# Patient Record
Sex: FEMALE | URBAN - METROPOLITAN AREA
[De-identification: names, ages, dates, MRNs, and addresses within clinical notes are randomized per-mention and may not be internally consistent; named-entity substitution may affect disease eponyms.]

---

## 2017-02-06 ENCOUNTER — OFFICE VISIT (OUTPATIENT)
Dept: INTERNAL MEDICINE CLINIC | Age: 57
End: 2017-02-06

## 2017-02-06 VITALS
RESPIRATION RATE: 12 BRPM | OXYGEN SATURATION: 98 % | WEIGHT: 132.2 LBS | DIASTOLIC BLOOD PRESSURE: 86 MMHG | TEMPERATURE: 97.8 F | HEART RATE: 65 BPM | BODY MASS INDEX: 22.57 KG/M2 | HEIGHT: 64 IN | SYSTOLIC BLOOD PRESSURE: 138 MMHG

## 2017-02-06 DIAGNOSIS — K51.80 OTHER ULCERATIVE COLITIS WITHOUT COMPLICATION (HCC): ICD-10-CM

## 2017-02-06 DIAGNOSIS — I10 ESSENTIAL HYPERTENSION WITH GOAL BLOOD PRESSURE LESS THAN 130/80: ICD-10-CM

## 2017-02-06 DIAGNOSIS — E78.00 ELEVATED CHOLESTEROL: ICD-10-CM

## 2017-02-06 DIAGNOSIS — E06.3 HASHIMOTO'S DISEASE: ICD-10-CM

## 2017-02-06 DIAGNOSIS — E03.9 HYPOTHYROIDISM, UNSPECIFIED TYPE: ICD-10-CM

## 2017-02-06 DIAGNOSIS — E04.2 MULTIPLE THYROID NODULES: ICD-10-CM

## 2017-02-06 DIAGNOSIS — I10 ESSENTIAL HYPERTENSION WITH GOAL BLOOD PRESSURE LESS THAN 130/80: Primary | ICD-10-CM

## 2017-02-06 NOTE — MR AVS SNAPSHOT
Visit Information Date & Time Provider Department Dept. Phone Encounter #  
 2/6/2017  9:30 AM Sterling Silva NP [facility] [address] [id number] Upcoming Health Maintenance Date Due COLONOSCOPY 4/30/2015 BREAST CANCER SCRN MAMMOGRAM 5/30/2016 INFLUENZA AGE 9 TO ADULT 8/1/2016 PAP AKA CERVICAL CYTOLOGY 3/24/2017 DTaP/Tdap/Td series (2 - Td) 10/13/2025 Allergies as of 2/6/2017  Review Complete On: 2/6/2017 By: Sterling Silva NP Severity Noted Reaction Type Reactions Sulfa (Sulfonamide Antibiotics) Medium 03/24/2014    Hives Sulfa (Sulfonamide Antibiotics)  10/13/2015    Hives Current Immunizations  Reviewed on 10/2/2015 Name Date Influenza Vaccine 10/2/2015 Tdap 10/13/2015  9:29 AM  
  
 Not reviewed this visit You Were Diagnosed With   
  
 Codes Comments Essential hypertension with goal blood pressure less than 130/80    -  Primary ICD-10-CM: I10 
ICD-9-CM: 401.9 Hypothyroidism, unspecified type     ICD-10-CM: E03.9 ICD-9-CM: 244.9 Hashimoto's disease     ICD-10-CM: E06.3 ICD-9-CM: 245.2 Multiple thyroid nodules     ICD-10-CM: E04.2 ICD-9-CM: 241.1 Other ulcerative colitis without complication (HCC)     SYX-93-GR: K51.80 Elevated cholesterol     ICD-10-CM: E78.00 ICD-9-CM: 272.0 Vitals BP Pulse Temp Resp Height(growth percentile) Weight(growth percentile) 138/86 (BP 1 Location: Left arm, BP Patient Position: Sitting) 65 97.8 °F (36.6 °C) (Oral) 12 5' 4\" (1.626 m) 132 lb 3.2 oz (60 kg) SpO2 BMI OB Status Smoking Status 98% 22.69 kg/m2 Postmenopausal Never Smoker BMI and BSA Data Body Mass Index Body Surface Area  
 22.69 kg/m 2 1.65 m 2 Preferred Pharmacy Pharmacy Name Phone [id number] [address] Your Updated Medication List  
  
   
 This list is accurate as of: 2/6/17 10:36 AM.  Always use your most recent med list.  
  
  
  
  
 * ASACOL  mg DR tablet Generic drug:  mesalamine DR Take 2,400 mg by mouth two (2) times a day. * mesalamine 4 gram/60 mL enema Commonly known as:  ROWASA Insert 4 g into rectum as needed (for colitis flare-up). cholecalciferol 1,000 unit Cap Commonly known as:  VITAMIN D3  
1000 units daily  
  
 fish oil-dha-epa 1,200-144-216 mg Cap Take 1,200 mg by mouth two (2) times a day. losartan 25 mg tablet Commonly known as:  COZAAR Take 1 Tab by mouth daily. synthroid 50 mcg tablet Generic drug:  levothyroxine Take 50-75 mcg by mouth Daily (before breakfast). 75 mcg Mon, Wed, and Friday and 50 mcg Tues, Thurs, Sat and Sun  
  
 * Notice: This list has 2 medication(s) that are the same as other medications prescribed for you. Read the directions carefully, and ask your doctor or other care provider to review them with you. We Performed the Following REFERRAL TO ENDOCRINOLOGY [ZCZ71 Custom] Comments:  
 Previously followed with Dr Elizabeth Bolanos, needs to establish care with new endocrinologist locally. Would like to establish care with Dr Rao Mcrae group. To-Do List   
 02/06/2017 Lab:  CBC WITH AUTOMATED DIFF   
  
 02/06/2017 Lab:  LIPID PANEL   
  
 02/06/2017 Lab:  METABOLIC PANEL, COMPREHENSIVE   
  
 02/06/2017 Lab:  T4, FREE   
  
 02/06/2017 Lab:  TSH 3RD GENERATION   
  
 05/06/2017 Lab:  CBC WITH AUTOMATED DIFF   
  
 05/06/2017 Lab:  LIPID PANEL   
  
 05/06/2017 Lab:  METABOLIC PANEL, COMPREHENSIVE   
  
 05/06/2017 Lab:  T4, FREE   
  
 05/06/2017 Lab:  TSH 3RD GENERATION Referral Information Referral ID Referred By Referred To  
  
 [id number] Natividad Whiting Not Available Visits Status Start Date End Date 1 New Request 2/6/17 2/6/18 If your referral has a status of pending review or denied, additional information will be sent to support the outcome of this decision. Patient Instructions Patient was given a copy of the Advanced Directive form and understands to bring it in once completed. Health Maintenance Due Topic Date Due  
 COLONOSCOPY  04/30/2015  BREAST CANCER SCRN MAMMOGRAM  05/30/2016  INFLUENZA AGE 9 TO ADULT  08/01/2016  PAP AKA CERVICAL CYTOLOGY  03/24/2017 Introducing [address]! Dear Diana Davalos: 
Thank you for requesting a GoodData account. Our records indicate that you already have an active GoodData account. You can access your account anytime at [email or web address] Did you know that you can access your hospital and ER discharge instructions at any time in GoodData? You can also review all of your test results from your hospital stay or ER visit. Additional Information If you have questions, please visit the Frequently Asked Questions section of the GoodData website at [email or web address] Remember, GoodData is NOT to be used for urgent needs. For medical emergencies, dial 911. Now available from your iPhone and Android! Please provide this summary of care documentation to your next provider. Your primary care clinician is listed as Estephania Abdi. If you have any questions after today's visit, please call [phone].

## 2017-02-06 NOTE — PROGRESS NOTES
HISTORY OF PRESENT ILLNESS  Sylwia Vo is a 59 y.o. female. Here today for routine follow up. Previous patient of Dr Dawit Lozano. PMH: HTN, UC, hypothyroidism, multiple thyroid nodules, renal stones. No complaints today. Hypertension    The history is provided by the patient. This is a chronic problem. The current episode started more than 1 week ago. The problem has not changed since onset. Pertinent negatives include no chest pain, no palpitations, no blurred vision, no headaches, no shortness of breath, no nausea and no vomiting. Risk factors include hypertension and postmenopause. Cholesterol Problem   The history is provided by the patient (elevated TC and LDL, good HDL). This is a chronic problem. The current episode started more than 1 week ago. The problem has not changed since onset. Pertinent negatives include no chest pain, no abdominal pain, no headaches and no shortness of breath. Associated symptoms comments: No family history of CAD. Treatments tried: diet controlled only. Hypothyroidism/ hashimotos/ thyroid nodules  Previously followed with endocrinology Dr Jesus Witt. Continues on synthroid 50mcg daily. She is due for labs. UC  No recent symptoms. She did meet with her GI and will be moving forward with routine colonoscopy for eval.     Past Medical History   Diagnosis Date    Calculus of kidney      with pregnancy.  H/O colonoscopy      due for repeat colonoscopy by end of 2015 per Dr. Katia Mackey Hypertension      has been borderline.  Hypothyroid     Thyroid disease      Hashimotos    Thyroid nodule      biopsied 1 yr ago, benign per pt with Dr Indigo Pastrana in [place] [address] Ulcerative colitis [facility])      last flare in Oct 2013. Dr Lorri Beard. Enemas work for her. Colonoscopy scheduled for April 2014 Q2 yrs. Past Surgical History   Procedure Laterality Date    Pr breast surgery procedure unlisted  1992, 2009     cyst removal, augmentation    Hx colonoscopy       Q 2 yrs.  Due April 2014.    Pr breast surgery procedure unlisted       augmentation     Current Outpatient Prescriptions   Medication Sig    losartan (COZAAR) 25 mg tablet Take 1 Tab by mouth daily.  fish oil-dha-epa 1,200-144-216 mg cap Take 1,200 mg by mouth two (2) times a day.  mesalamine DR (ASACOL HD) 800 mg DR tablet Take 2,400 mg by mouth two (2) times a day.  levothyroxine (SYNTHROID) 50 mcg tablet Take 50-75 mcg by mouth Daily (before breakfast). 75 mcg Mon, Wed, and Friday and 50 mcg Tues, Thurs, Sat and Sun    mesalamine (ROWASA) 4 gram/60 mL enema Insert 4 g into rectum as needed (for colitis flare-up).  Cholecalciferol, Vitamin D3, (VITAMIN D3) 1,000 unit cap 1000 units daily     No current facility-administered medications for this visit. Allergies and Intolerances: Allergies   Allergen Reactions    Sulfa (Sulfonamide Antibiotics) Hives    Sulfa (Sulfonamide Antibiotics) Hives     Family History:   Family History   Problem Relation Age of Onset    Cancer Father      lymphoma    Hypertension Mother         Social History:   She  reports that she has never smoked. She does not have any smokeless tobacco history on file. She  reports that she drinks about 2.4 oz of alcohol per week      Vitals:   Visit Vitals    /86 (BP 1 Location: Left arm, BP Patient Position: Sitting)    Pulse 65    Temp 97.8 °F (36.6 °C) (Oral)    Resp 12    Ht 5' 4\" (1.626 m)    Wt 132 lb 3.2 oz (60 kg)    SpO2 98%    BMI 22.69 kg/m2        Body surface area is 1.65 meters squared. BP Readings from Last 3 Encounters:   02/06/17 138/86   06/22/16 (!) 140/94   10/13/15 (!) 150/97        Wt Readings from Last 3 Encounters:   02/06/17 132 lb 3.2 oz (60 kg)   06/22/16 125 lb 6.4 oz (56.9 kg)   10/13/15 130 lb (59 kg)        Case and notes discussed with MA and reviewed with patient for accuracy.      I have personally reviewed and subsequently discussed with the MA any pertinent, preliminary and incomplete elements of the history related to the chief complaint that were recorded by the MA in the patients chart during the initial rooming of the patient. As I have explored the necessary and required elements in detail with the patient during my personal interview with them, I have personally verified, clarified, amended, added to and/or revised said elements based on information acquired during during the interview. Review of Systems   Constitutional: Negative for chills and fever. HENT: Negative for congestion, ear pain and sore throat. Eyes: Negative for blurred vision and double vision. Respiratory: Negative for cough, hemoptysis, shortness of breath and wheezing. Cardiovascular: Negative for chest pain, palpitations and leg swelling. Gastrointestinal: Negative for abdominal pain, blood in stool, constipation, diarrhea, melena, nausea and vomiting. Genitourinary: Negative for dysuria, frequency, hematuria and urgency. Musculoskeletal: Negative for falls. Skin: Negative for itching and rash. Neurological: Negative for seizures, loss of consciousness and headaches. Physical Exam   Constitutional: She appears well-developed and well-nourished. No distress. HENT:   Head: Normocephalic and atraumatic. Nose: Nose normal.   Mouth/Throat: Uvula is midline, oropharynx is clear and moist and mucous membranes are normal.   Eyes: Conjunctivae are normal. Pupils are equal, round, and reactive to light. Cardiovascular: Normal rate, regular rhythm and normal heart sounds. Pulmonary/Chest: Effort normal and breath sounds normal. No respiratory distress. She has no wheezes. Abdominal: Soft. Bowel sounds are normal. She exhibits no distension. There is no tenderness. Musculoskeletal: She exhibits no edema. Neurological: She is alert. Skin: Skin is warm and dry. No rash noted. Psychiatric: She has a normal mood and affect.  Her behavior is normal.   Nursing note and vitals reviewed. 3.6%  10-year risk of heart disease or stroke  Learn how you can track your risk. On the basis of your age and calculated risk for heart disease or stroke under 10%, the USPSTF guidelines suggest you would not likely benefit from starting aspirin. On the basis of your calculated risk for heart disease or stroke less than 7.5%, the ACC/AHA guidelines suggest you have no indication to be on a statin. Based on your age, your blood pressure is well-controlled. Demography Cholesterol Blood pressure Risk factors   Age: 59 Total: 505 Systolic: 320 Diabetes: no   Gender: female HDL: 96 Diastolic: 86 Smoking: no   Race: not African-American  On medication: yes    Notes and further reading  Moderate intensity statin may be atorvastatin 10mg, pravastatin 40mg, or simvastatin 20-40mg. High intensity statin may be atorvastatin 40mg-80mg. AHA/ACC guidelines stress the importance of lifestyle modifications to lower cardiovascular disease risk in all patients. This includes eating a heart-healthy diet, regular aerobic exercises, maintenance of desirable body weight and avoidance of tobacco products. Before initiating statin therapy, clinicians and patients ought to engage in a discussion which considers addressing risk factors such as smoking and optimal lifestyle, the potential for ASCVD risk reduction benefits, adverse medication effects, drug-drug interactions, and patient preferences for treatment. Additional factors may be considered to inform treatment decision making.  These factors may include primary LDL-C greater than 160 mg/dL or other evidence of genetic hyperlipidemias, family history of premature ASCVD with onset less than 54years of age in a first degree male relative or less than 72years of age in a first degree female relative, high-sensitivity C-reactive protein greater than 2 mg/L, CAC score greater than 300 Agatston units or greater than 76 percentile for age, sex, and ethnicity, ankle-brachial index less than 0.9, or elevated lifetime risk of ASCVD. ASSESSMENT and PLAN    ICD-10-CM ICD-9-CM    1. Essential hypertension with goal blood pressure less than 130/80: stable, well controlled, continues on losartan 25mg daily. Routine fasting labs ordered. Will have these drawn now and again prior to our 6 month follow up as well. I10 401.9 CBC WITH AUTOMATED DIFF      METABOLIC PANEL, COMPREHENSIVE      CBC WITH AUTOMATED DIFF      METABOLIC PANEL, COMPREHENSIVE   2. Hypothyroidism, unspecified type: continues on synthroid 50mcg daily. Due for labs, ordered today. She would like to establish care with a new endocrinologist as her previous one has left the area. Referral placed today. E03.9 244.9 REFERRAL TO ENDOCRINOLOGY      CBC WITH AUTOMATED DIFF      METABOLIC PANEL, COMPREHENSIVE      TSH 3RD GENERATION      T4, FREE      CBC WITH AUTOMATED DIFF      METABOLIC PANEL, COMPREHENSIVE      TSH 3RD GENERATION      T4, FREE   3. Hashimoto's disease: will check thyroid labs. will be establishing care with endocrinology for monitoring this. E06.3 245.2 REFERRAL TO ENDOCRINOLOGY      TSH 3RD GENERATION      T4, FREE      TSH 3RD GENERATION      T4, FREE   4. Multiple thyroid nodules: will be establishing care with endocrinology for monitoring this. E04.2 241.1 REFERRAL TO ENDOCRINOLOGY      TSH 3RD GENERATION      T4, FREE      TSH 3RD GENERATION      T4, FREE   5. Other ulcerative colitis without complication ([facility] [address]): stable, no recent flairs, will be moving forward with colonoscopy this year with GI.  K51.80     6. Elevated cholesterol: Discussed elevated cholesterol with patient, what this is, what this means for their health, increased risks associated like heart attack and stroke. 10 year risk calculated at 3.6% not in statin benefit group. Will continue to work on healthy lifestyle, diet, exercise.     E78.00 272.0 LIPID PANEL      LIPID PANEL     Gissellevenus Parra was seen today for hypertension, thyroid problem, vitamin d deficiency and labs. Diagnoses and all orders for this visit:    Essential hypertension with goal blood pressure less than 130/80  -     CBC WITH AUTOMATED DIFF; Future  -     METABOLIC PANEL, COMPREHENSIVE; Future  -     CBC WITH AUTOMATED DIFF; Future  -     METABOLIC PANEL, COMPREHENSIVE; Future    Hypothyroidism, unspecified type  -     REFERRAL TO ENDOCRINOLOGY  -     CBC WITH AUTOMATED DIFF; Future  -     METABOLIC PANEL, COMPREHENSIVE; Future  -     TSH 3RD GENERATION; Future  -     T4, FREE; Future  -     CBC WITH AUTOMATED DIFF; Future  -     METABOLIC PANEL, COMPREHENSIVE; Future  -     TSH 3RD GENERATION; Future  -     T4, FREE; Future    Hashimoto's disease  -     REFERRAL TO ENDOCRINOLOGY  -     TSH 3RD GENERATION; Future  -     T4, FREE; Future  -     TSH 3RD GENERATION; Future  -     T4, FREE; Future    Multiple thyroid nodules  -     REFERRAL TO ENDOCRINOLOGY  -     TSH 3RD GENERATION; Future  -     T4, FREE; Future  -     TSH 3RD GENERATION; Future  -     T4, FREE; Future    Other ulcerative colitis without complication (HCC)    Elevated cholesterol  -     LIPID PANEL; Future  -     LIPID PANEL; Future      Follow-up Disposition:  Return in about 6 months (around 8/6/2017), or if symptoms worsen or fail to improve. The plan of care was discussed with the patient, who verbalizes understanding. Discussed all medications, side effects with patient. The patient is to follow up as scheduled and will report to the ED or the office if symptoms change or increase. The patient has voiced understanding and will comply to plan of care.

## 2017-02-06 NOTE — PATIENT INSTRUCTIONS
Patient was given a copy of the Advanced Directive form and understands to bring it in once completed.   Health Maintenance Due   Topic Date Due    COLONOSCOPY  04/30/2015    BREAST CANCER SCRN MAMMOGRAM  05/30/2016    INFLUENZA AGE 9 TO ADULT  08/01/2016    PAP AKA CERVICAL CYTOLOGY  03/24/2017

## 2017-02-06 NOTE — PROGRESS NOTES
Chief Complaint   Patient presents with    Hypertension    Thyroid Problem    Vitamin D Deficiency    Labs     in Media of chart [facility] done 8-30-16 to discuss      Patient was given a copy of the Advanced Directive form and understands to bring it in once completed. 1. Have you been to the ER, urgent care clinic since your last visit? Hospitalized since your last visit? No    2. Have you seen or consulted any other health care providers outside of the [address] [place] [address] since your last visit? Include any pap smears or colon screening.  No

## 2017-02-11 LAB
CREATININE, EXTERNAL: 0.72
LDL-C, EXTERNAL: 108

## 2017-02-13 ENCOUNTER — TELEPHONE (OUTPATIENT)
Dept: INTERNAL MEDICINE CLINIC | Age: 57
End: 2017-02-13

## 2017-02-13 NOTE — TELEPHONE ENCOUNTER
Please call patient and notify her of stable labs received from [facility]. Follow up as scheduled sooner if needed. Thanks!

## 2017-06-11 PROBLEM — E04.1 THYROID NODULE: Status: ACTIVE | Noted: 2017-06-11

## 2017-06-21 DIAGNOSIS — I10 ESSENTIAL HYPERTENSION WITH GOAL BLOOD PRESSURE LESS THAN 130/85: ICD-10-CM

## 2017-06-21 RX ORDER — LOSARTAN POTASSIUM 25 MG/1
25 TABLET ORAL DAILY
Qty: 90 TAB | Refills: 3 | Status: SHIPPED | OUTPATIENT
Start: 2017-06-21 | End: 2019-04-23 | Stop reason: ALTCHOICE

## 2018-08-21 ENCOUNTER — OFFICE VISIT (OUTPATIENT)
Dept: INTERNAL MEDICINE CLINIC | Age: 58
End: 2018-08-21

## 2018-08-21 VITALS
WEIGHT: 136.2 LBS | OXYGEN SATURATION: 98 % | HEIGHT: 64 IN | TEMPERATURE: 97.8 F | HEART RATE: 74 BPM | SYSTOLIC BLOOD PRESSURE: 142 MMHG | DIASTOLIC BLOOD PRESSURE: 90 MMHG | BODY MASS INDEX: 23.25 KG/M2 | RESPIRATION RATE: 12 BRPM

## 2018-08-21 DIAGNOSIS — M72.2 PLANTAR FASCIITIS: ICD-10-CM

## 2018-08-21 DIAGNOSIS — E04.1 THYROID NODULE: ICD-10-CM

## 2018-08-21 DIAGNOSIS — K51.80 OTHER ULCERATIVE COLITIS WITHOUT COMPLICATION (HCC): ICD-10-CM

## 2018-08-21 DIAGNOSIS — I10 ESSENTIAL HYPERTENSION WITH GOAL BLOOD PRESSURE LESS THAN 130/80: Primary | ICD-10-CM

## 2018-08-21 DIAGNOSIS — E06.3 HASHIMOTO'S DISEASE: ICD-10-CM

## 2018-08-21 RX ORDER — MULTIVITAMIN
TABLET ORAL DAILY
COMMUNITY

## 2018-08-21 NOTE — PROGRESS NOTES
Chief Complaint   Patient presents with   [address] Hypertension     follow up   Patient was given a copy of the Advanced Medical Directive form and understands to bring it in once completed. 1. Have you been to the ER, urgent care clinic since your last visit? Hospitalized since your last visit? No    2. Have you seen or consulted any other health care providers outside of the [address] [place] [address] since your last visit? Include any pap smears or colon screening.  No

## 2018-08-21 NOTE — PATIENT INSTRUCTIONS
Patient was given a copy of the Advanced Medical Directive form and understands to bring it in once completed.   Health Maintenance Due   Topic Date Due    BREAST CANCER SCRN MAMMOGRAM  05/30/2016    PAP AKA CERVICAL CYTOLOGY  03/24/2017    ZOSTER VACCINE AGE 60>  12/01/2017    Influenza Age 9 to Adult  08/01/2018    COLONOSCOPY  12/17/2018

## 2018-08-21 NOTE — MR AVS SNAPSHOT
[facility] 
 
 
 [address], [address] [facility] 
[phone] Patient: Carlos Alberto Ventura MRN: [id number] MSC:[id number] Visit Information Date & Time Provider Department Dept. Phone Encounter #  
 8/21/2018 10:00 AM Annie Flores MD [facility] [place] [id number] Follow-up Instructions Return in about 5 months (around 1/15/2019) for BP check, lab review. Your Appointments 1/11/2019  8:15 AM  
Office Visit with Annie Flores MD  
[facility]) [address], [address] [place] [address] [address] [facility]  
  
   
 [address], [address] Upcoming Health Maintenance Date Due  
 BREAST CANCER SCRN MAMMOGRAM 5/30/2016 PAP AKA CERVICAL CYTOLOGY 3/24/2017 ZOSTER VACCINE AGE 60> 12/1/2017 Influenza Age 5 to Adult 8/1/2018 COLONOSCOPY 12/17/2018 DTaP/Tdap/Td series (2 - Td) 10/13/2025 Allergies as of 8/21/2018  Review Complete On: 8/21/2018 By: Annie Flores MD  
  
 Severity Noted Reaction Type Reactions Sulfa (Sulfonamide Antibiotics) Medium 10/28/2013    Hives Sulfa (Sulfonamide Antibiotics)  10/13/2015    Hives Current Immunizations  Reviewed on 10/2/2015 Name Date Influenza Vaccine 10/2/2015 Tdap 10/13/2015  9:29 AM  
  
 Not reviewed this visit Vitals BP Pulse Temp Resp Height(growth percentile) Weight(growth percentile) 142/90 (BP 1 Location: Right arm, BP Patient Position: Sitting) 74 97.8 °F (36.6 °C) (Oral) 12 5' 4\" (1.626 m) 136 lb 3.2 oz (61.8 kg) SpO2 BMI OB Status Smoking Status 98% 23.38 kg/m2 Postmenopausal Never Smoker Vitals History BMI and BSA Data Body Mass Index Body Surface Area  
 23.38 kg/m 2 1.67 m 2 Preferred Pharmacy Pharmacy Name Phone [facility], [address] [place] [address] Your Updated Medication List  
  
   
This list is accurate as of 8/21/18 11:16 AM.  Always use your most recent med list.  
  
  
  
  
 * ASACOL  mg DR tablet Generic drug:  mesalamine DR Take 2,400 mg by mouth daily. * mesalamine 4 gram/60 mL enema Commonly known as:  ROWASA Insert 4 g into rectum as needed (for colitis flare-up). DAILY MULTIPLE FOR WOMEN 50+ 0.4 mg Tab Generic drug:  Vit B Comp & C-Vit E-FA-Carol-Zn Take  by mouth daily. fish oil-dha-epa 1,200-144-216 mg Cap Take 1,000 mg by mouth daily. losartan 25 mg tablet Commonly known as:  COZAAR Take 1 Tab by mouth daily. synthroid 50 mcg tablet Generic drug:  levothyroxine Take 50-75 mcg by mouth Daily (before breakfast). 75 mcg Mon, Wed, and Friday and 50 mcg Tues, Thurs, Sat and Sun  
  
 * Notice: This list has 2 medication(s) that are the same as other medications prescribed for you. Read the directions carefully, and ask your doctor or other care provider to review them with you. Follow-up Instructions Return in about 5 months (around 1/15/2019) for BP check, lab review. Patient Instructions Patient was given a copy of the Advanced Medical Directive form and understands to bring it in once completed. Health Maintenance Due Topic Date Due  
 BREAST CANCER SCRN MAMMOGRAM  05/30/2016  PAP AKA CERVICAL CYTOLOGY  03/24/2017  ZOSTER VACCINE AGE 60>  12/01/2017  Influenza Age 5 to Adult  08/01/2018  COLONOSCOPY  12/17/2018 Introducing [facility]! Dear Carrie Villegas: 
Thank you for requesting a Seakeeper account. Our records indicate that you already have an active Seakeeper account. You can access your account anytime at [email or web address] Did you know that you can access your hospital and ER discharge instructions at any time in Seakeeper? You can also review all of your test results from your hospital stay or ER visit. Additional Information If you have questions, please visit the Frequently Asked Questions section of the [employer] website at [email or web address] Remember, [employer] is NOT to be used for urgent needs. For medical emergencies, dial 911. Now available from your iPhone and Android! Please provide this summary of care documentation to your next provider. Your primary care clinician is listed as Lita Landeros. If you have any questions after today's visit, please call [phone].

## 2018-08-21 NOTE — PROGRESS NOTES
INTERNISTS OF [facility]:  8/21/2018, MRN: [id number]      Shelly Parker is a 59 y.o. female and presents to clinic to Lina Wheeler and Hypertension (follow up)    Subjective: The patient is a 77-year-old female with history of ?hypertension, hyperlipidemia, Hashimoto's disease per EHR, thyroid nodule, and ulcerative colitis (diagnosed at age 23 and followed by Neeraj Price). 1. ?HTN: She takes losartan 25 mg daily. She reports no adverse side effects of taking his medication. Her blood pressure today is mildly elevated at 142/90. She checks her blood pressure off and on at home and there are readings less than 120/80. 2.  Hypothyroidism and History of Thyroid Nodules: She is followed by [facility]. She is on Synthroid. She is presently asymptomatic. 3.  Ulcerative Colitis: She was diagnosed at age 23. Her last flare was 4 years ago. Since then, she has not had any rectal bleeding or hematochezia. No cramping. She gets colonoscopies every 2 years. Her last colonoscopy was a year ago. She also gets regular eye exams and denies vision changes. No arthralgias. +Followed by . 4.  Right plantar fasciitis?:  This year she reports a shooting/stabbing pain along her right foot lateral edge, associated with stiffness in the morning. She also has stiffness along her calf in the morning. Symptoms improve/resolve with stretching exercises. 5.  Health Maintenance:  -No tobacco use. No drug use. No energy drink consumption.  -She will have 2-3 glasses of wine each night.  -She is followed by dermatologist for yearly skin exams.  -Her mammogram is scheduled for next month. She denies breast pain or masses.       Patient Active Problem List    Diagnosis Date Noted    Thyroid nodule 06/11/2017    Essential hypertension with goal blood pressure less than 130/80 06/22/2016    Hyperlipidemia 06/22/2016    Hashimoto's disease 03/24/2014    UC (ulcerative colitis) ([facility] [address] 03/24/2014       Current Outpatient Prescriptions   Medication Sig Dispense Refill    Vit B Comp & C-Vit E-FA-Carol-Zn (DAILY MULTIPLE FOR WOMEN 50+) 0.4 mg tab Take  by mouth daily.  losartan (COZAAR) 25 mg tablet Take 1 Tab by mouth daily. 90 Tab 3    fish oil-dha-epa 1,200-144-216 mg cap Take 1,000 mg by mouth daily.  mesalamine DR (ASACOL HD) 800 mg DR tablet Take 2,400 mg by mouth daily.  levothyroxine (SYNTHROID) 50 mcg tablet Take 50-75 mcg by mouth Daily (before breakfast). 75 mcg Mon, Wed, and Friday and 50 mcg Tues, Thurs, Sat and Sun      mesalamine (ROWASA) 4 gram/60 mL enema Insert 4 g into rectum as needed (for colitis flare-up). Allergies   Allergen Reactions    Sulfa (Sulfonamide Antibiotics) Hives    Sulfa (Sulfonamide Antibiotics) Hives       Past Medical History:   Diagnosis Date    Autoimmune disease ([facility] [address]     Calculus of kidney     with pregnancy.  H/O colonoscopy     due for repeat colonoscopy by end of 2015 per Dr. Mya Zamora    Hypercholesterolemia     Hypertension     Hypothyroid     Thyroid disease     Hashimotos    Thyroid nodule     biopsied 1 yr ago, benign per pt with Dr Angi Mark in [place] [address] Ulcerative colitis [facility])     last flare in Oct 2013. Dr Mya Zamora. Enemas work for her. Colonoscopy scheduled for April 2014 Q2 yrs. Past Surgical History:   Procedure Laterality Date    BREAST SURGERY PROCEDURE UNLISTED  1992, 2009    cyst removal, augmentation    BREAST SURGERY PROCEDURE UNLISTED      augmentation    HX BREAST AUGMENTATION  2009    HX COLONOSCOPY      Q 2 yrs. Due April 2014.     HX WISDOM TEETH EXTRACTION      x2       Family History   Problem Relation Age of Onset    Hypertension Mother     Thyroid Disease Mother     Cancer Father      lymphoma    Thyroid Disease Brother     Depression Brother     Thyroid Disease Sister     No Known Problems Maternal Grandmother     No Known Problems Maternal Grandfather     No Known Problems Paternal Grandmother     No Known Problems Paternal Grandfather     No Known Problems Son     Thyroid Disease Daughter      1 daughter       Social History   Substance Use Topics    Smoking status: Never Smoker    Smokeless tobacco: Never Used    Alcohol use 2.4 oz/week     4 Glasses of wine per week      Comment: weekends       ROS   Review of Systems   Constitutional: Negative for chills and fever. HENT: Negative for ear pain and sore throat. Eyes: Negative for blurred vision and pain. Respiratory: Negative for cough and shortness of breath. Cardiovascular: Negative for chest pain. Gastrointestinal: Negative for abdominal pain, blood in stool and melena. Genitourinary: Negative for dysuria and hematuria. Musculoskeletal: Positive for joint pain. Negative for myalgias. Skin: Negative for rash. Neurological: Negative for tingling, focal weakness and headaches. Endo/Heme/Allergies: Does not bruise/bleed easily. Psychiatric/Behavioral: Negative for substance abuse. Objective     Vitals:    08/21/18 0953 08/21/18 1111 08/21/18 1113   BP: (!) 154/102 (!) 150/93 142/90   Pulse: 73 74 74   Resp: 12 12    Temp: 97.8 °F (36.6 °C)     TempSrc: Oral     SpO2: 98% 98%    Weight: 136 lb 3.2 oz (61.8 kg)     Height: 5' 4\" (1.626 m)     PainSc:   0 - No pain   0 - No pain        Physical Exam   Constitutional: She is oriented to person, place, and time and well-developed, well-nourished, and in no distress. HENT:   Head: Normocephalic and atraumatic. Right Ear: External ear normal.   Left Ear: External ear normal.   Nose: Nose normal.   Mouth/Throat: Oropharynx is clear and moist. No oropharyngeal exudate. Eyes: Conjunctivae and EOM are normal. Pupils are equal, round, and reactive to light. Right eye exhibits no discharge. Left eye exhibits no discharge. No scleral icterus. Neck: Neck supple. No thyromegaly present.    Cardiovascular: Normal rate, regular rhythm, normal heart sounds and intact distal pulses. Exam reveals no gallop and no friction rub. No murmur heard. Pulmonary/Chest: Effort normal and breath sounds normal. No respiratory distress. She has no wheezes. She has no rales. Abdominal: Soft. Bowel sounds are normal. She exhibits no distension. There is no tenderness. There is no rebound and no guarding. No organomegaly   Musculoskeletal: She exhibits no edema or tenderness (BUE). Good ROM along her right foot   Lymphadenopathy:     She has no cervical adenopathy. Neurological: She is alert and oriented to person, place, and time. She exhibits normal muscle tone. Gait normal.   Skin: Skin is warm and dry. No erythema. Psychiatric: Affect normal.   Nursing note and vitals reviewed. LABS   Data Review:   Lab Results   Component Value Date/Time    WBC 5.8 10/13/2015 09:30 AM    HGB 13.7 10/13/2015 09:30 AM    HCT 42.1 10/13/2015 09:30 AM    PLATELET 921 41/53/0104 09:30 AM    MCV 89.0 10/13/2015 09:30 AM       Lab Results   Component Value Date/Time    Sodium 139 10/13/2015 09:30 AM    Potassium 4.3 10/13/2015 09:30 AM    Chloride 101 10/13/2015 09:30 AM    CO2 31 10/13/2015 09:30 AM    Anion gap 7 10/13/2015 09:30 AM    Glucose 95 10/13/2015 09:30 AM    BUN 12 10/13/2015 09:30 AM    Creatinine 0.60 10/13/2015 09:30 AM    BUN/Creatinine ratio 20 10/13/2015 09:30 AM    GFR est AA >60 10/13/2015 09:30 AM    GFR est non-AA >60 10/13/2015 09:30 AM    Calcium 9.0 10/13/2015 09:30 AM       Assessment/Plan:   1. Ulcerative Colitis: Stable. -Continue with mesalamine and GI follow-up. -I will need to check a CMP, CRP, and a CBC is not checked before her follow-up appointment with me. She is scheduled with the GI team in December. 2.  Hypothyroidism: Stable. -Continue Synthroid. I will need to check TFTs and a CBC is not checked prior to her follow-up appointment with me in 5 months.    3. ?HTN Hx: +White coat hypertension per her hx given low BPs at home - but asymptomatic on losartan.  -Okay to continue with losartan. I told her she can stop this medication and monitor her blood pressure at home. We discussed how her low blood pressure is her most accurate blood pressure and how her blood pressure can be falsely elevated secondary to whitecoat hypertension. We discussed her blood pressure goal of less than 140/90.  -I will need to order a BMP, lipid panel, and a urine protein screen is not checked just prior to her follow-up appointment. 4.  Right Plantar Fasciitis?  -The patient declined a referral to orthopedics at this time. She will let me know if her symptoms worsen.    -For now, activity as tolerated is recommended. She can take Tylenol as needed. 5.  Health Maintenance:  -I encouraged her to get her mammogram.  -The patient stated that she will schedule an appointment for a Pap smear, declining for me to order a referral      Health Maintenance Due   Topic Date Due    BREAST CANCER SCRN MAMMOGRAM  05/30/2016    PAP AKA CERVICAL CYTOLOGY  03/24/2017    ZOSTER VACCINE AGE 60>  12/01/2017    Influenza Age 9 to Adult  08/01/2018    COLONOSCOPY  12/17/2018     Lab review: labs are reviewed in the EHR     I have discussed the diagnosis with the patient and the intended plan as seen in the above orders. The patient has received an after-visit summary and questions were answered concerning future plans. I have discussed medication side effects and warnings with the patient as well. I have reviewed the plan of care with the patient, accepted their input and they are in agreement with the treatment goals. All questions were answered. The patient understands the plan of care. Handouts provided today with above information. Pt instructed if symptoms worsen to call the office or report to the ED for continued care. Greater than 50% of the visit time was spent in counseling and/or coordination of care.       Voice recognition was used to generate this report, which may have resulted in some phonetic based errors in grammar and contents. Even though attempts were made to correct all the mistakes, some may have been missed, and remained in the body of the document. Follow-up Disposition:  Return in about 5 months (around 1/15/2019) for BP check, lab review.     Torres Gambino MD

## 2019-02-21 LAB — MAMMOGRAPHY, EXTERNAL: NORMAL

## 2019-03-16 LAB — CREATININE, EXTERNAL: 0.64

## 2019-04-04 ENCOUNTER — TELEPHONE (OUTPATIENT)
Dept: INTERNAL MEDICINE CLINIC | Age: 59
End: 2019-04-04

## 2019-04-05 NOTE — TELEPHONE ENCOUNTER
Called patient and verified full name and date of birth. Informed patient of Dr. Jose L Moody message on patient's lab results. Patient verbalized understanding without questions or concerns.

## 2019-04-23 ENCOUNTER — OFFICE VISIT (OUTPATIENT)
Dept: INTERNAL MEDICINE CLINIC | Age: 59
End: 2019-04-23

## 2019-04-23 VITALS
TEMPERATURE: 97.4 F | DIASTOLIC BLOOD PRESSURE: 82 MMHG | RESPIRATION RATE: 12 BRPM | HEART RATE: 64 BPM | SYSTOLIC BLOOD PRESSURE: 138 MMHG | BODY MASS INDEX: 20.18 KG/M2 | OXYGEN SATURATION: 98 % | WEIGHT: 118.2 LBS | HEIGHT: 64 IN

## 2019-04-23 DIAGNOSIS — K51.20 ULCERATIVE PROCTITIS WITHOUT COMPLICATION (HCC): ICD-10-CM

## 2019-04-23 DIAGNOSIS — R03.0 ELEVATED BLOOD PRESSURE READING: ICD-10-CM

## 2019-04-23 DIAGNOSIS — E04.1 THYROID NODULE: Primary | ICD-10-CM

## 2019-04-23 DIAGNOSIS — E78.2 MIXED HYPERLIPIDEMIA: ICD-10-CM

## 2019-04-23 RX ORDER — LEVOTHYROXINE SODIUM 75 UG/1
75 TABLET ORAL
COMMUNITY

## 2019-04-23 RX ORDER — LEVOTHYROXINE SODIUM 50 UG/1
50 TABLET ORAL 2 TIMES WEEKLY
COMMUNITY
End: 2022-08-24 | Stop reason: SDUPTHER

## 2019-04-23 RX ORDER — CALCIUM CARB/VITAMIN D3/VIT K1 650MG-12.5
13 TABLET,CHEWABLE ORAL DAILY
COMMUNITY
End: 2022-08-24

## 2019-04-23 RX ORDER — MESALAMINE 800 MG/1
2400 TABLET, DELAYED RELEASE ORAL 2 TIMES DAILY
COMMUNITY

## 2019-04-23 NOTE — ACP (ADVANCE CARE PLANNING)
Patient stated she has a Medical Directive Form, and understands to bring it in to be scanned into the chart. Mona Zambrano

## 2019-04-23 NOTE — PROGRESS NOTES
INTERNISTS [facility]:  4/23/2019, MRN: [id number]      Galilea Sneed is a 62 y.o. female and presents to clinic for Hypertension (5 hugh follow up    ROOM 3) and Labs ([facility] done 3-15-19)    Subjective: The patient is a 55-year-old female with history of ?hypertension, hyperlipidemia, Hashimoto's disease per EHR, thyroid nodule, and ulcerative colitis (diagnosed at age 23 and followed by Krista Whitney). 1. ?HTN: She was on losartan 25 mg daily in the past.  She was checking her blood pressure at home and her readings were less than 120/80. As result, at her last appointment, I advised her to stop her medication and to check her BP at home without it. Since then,she stopped taking it. Today, her BP is 138/82. She walks miles each day. She eats \"clean\" - low carb diet w/ no red meat. 2.  Hypothyroidism/Thyroid Nodules: Present for over 6 months. Followed by  [facility]. On synthroid. Her last ultrasound of her thyroid gland was: 2/19 - her nodules decreased in size! She is presently asymptomatic. She had her Synthroid dose increased in 2/19. She has had some weight loss but admits to exercising regularly and dieting. 3. UC: Followed by GI (). On mesalamine. Diagnosed at the age of 23. No rectal bleeding since her last apt. She gets colonoscopies every 2 yrs for surveillance. Her next colonoscopy is in 12/19. 4. Health Maintenance:  - Last PAP: since her last apt, it was normal.       Patient Active Problem List    Diagnosis Date Noted    Thyroid nodule 06/11/2017    Essential hypertension with goal blood pressure less than 130/80 06/22/2016    Hyperlipidemia 06/22/2016    Hashimoto's disease 03/24/2014    UC (ulcerative colitis) ([facility] [address] 03/24/2014       Current Outpatient Medications   Medication Sig Dispense Refill    mesalamine DR (ASACOL HD) 800 mg DR tablet Take 2,400 mg by mouth two (2) times a day.       levothyroxine (SYNTHROID) 75 mcg tablet Take 75 mcg by mouth five (5) days a week.  levothyroxine (SYNTHROID) 50 mcg tablet Take 50 mcg by mouth two (2) times a week.  calcium-vitamin D3-vitamin K (VIACTIV) 650 mg-12.5 mcg-40 mcg chew Take 13 mg by mouth daily.  Vit B Comp & C-Vit E-FA-Carol-Zn (DAILY MULTIPLE FOR WOMEN 50+) 0.4 mg tab Take  by mouth daily.  mesalamine (ROWASA) 4 gram/60 mL enema Insert 4 g into rectum as needed (for colitis flare-up). Allergies   Allergen Reactions    Sulfa (Sulfonamide Antibiotics) Hives    Sulfa (Sulfonamide Antibiotics) Hives       Past Medical History:   Diagnosis Date    Autoimmune disease ([facility] [address]     Calculus of kidney     with pregnancy.  H/O colonoscopy     due for repeat colonoscopy by end of 2015 per Dr. Mya Zamora    Hypercholesterolemia     Hypertension     Hypothyroid     Thyroid disease     Hashimotos    Thyroid nodule     biopsied 1 yr ago, benign per pt with Dr Angi Mark in [place] [address] Ulcerative colitis [facility])     last flare in Oct 2013. Dr Mya Zamora. Enemas work for her. Colonoscopy scheduled for April 2014 Q2 yrs. Past Surgical History:   Procedure Laterality Date    BREAST SURGERY PROCEDURE UNLISTED  1992, 2009    cyst removal, augmentation    BREAST SURGERY PROCEDURE UNLISTED      augmentation    HX BREAST AUGMENTATION  2009    HX COLONOSCOPY      Q 2 yrs. Due April 2014.     HX WISDOM TEETH EXTRACTION      x2       Family History   Problem Relation Age of Onset    Hypertension Mother     Thyroid Disease Mother     Cancer Father         lymphoma    Thyroid Disease Brother     Depression Brother     Thyroid Disease Sister     No Known Problems Maternal Grandmother     No Known Problems Maternal Grandfather     No Known Problems Paternal Grandmother     No Known Problems Paternal Grandfather     No Known Problems Son     Thyroid Disease Daughter         1 daughter       Social History     Tobacco Use    Smoking status: Never Smoker    Smokeless tobacco: Never Used   Substance Use Topics    Alcohol use: Yes     Alcohol/week: 2.4 oz     Types: 4 Glasses of wine per week     Comment: weekends       ROS   Review of Systems   Constitutional: Negative for chills and fever. HENT: Negative for ear pain and sore throat. Eyes: Negative for blurred vision and pain. Respiratory: Negative for cough and shortness of breath. Cardiovascular: Negative for chest pain. Gastrointestinal: Negative for abdominal pain, blood in stool and melena. Genitourinary: Negative for dysuria and hematuria. Musculoskeletal: Positive for joint pain (RLE pain/nodule - seeing Orthopedics for this w/i the next month). Negative for myalgias. Skin: Negative for rash. Neurological: Positive for tingling (along rle nodule on leg). Negative for focal weakness and headaches. Endo/Heme/Allergies: Does not bruise/bleed easily. Psychiatric/Behavioral: Negative for substance abuse. Objective     Vitals:    04/23/19 1039   BP: 138/82   Pulse: 64   Resp: 12   Temp: 97.4 °F (36.3 °C)   TempSrc: Oral   SpO2: 98%   Weight: 118 lb 3.2 oz (53.6 kg)   Height: 5' 4\" (1.626 m)   PainSc:   0 - No pain       Physical Exam   Constitutional: She is oriented to person, place, and time and well-developed, well-nourished, and in no distress. HENT:   Head: Normocephalic and atraumatic. Right Ear: External ear normal.   Left Ear: External ear normal.   Nose: Nose normal.   Mouth/Throat: Oropharynx is clear and moist. No oropharyngeal exudate. Eyes: Pupils are equal, round, and reactive to light. Conjunctivae and EOM are normal. Right eye exhibits no discharge. Left eye exhibits no discharge. No scleral icterus. Neck: Neck supple. Cardiovascular: Normal rate, regular rhythm, normal heart sounds and intact distal pulses. Exam reveals no gallop and no friction rub. No murmur heard. Pulmonary/Chest: Effort normal and breath sounds normal. No respiratory distress. She has no wheezes.  She has no rales.   Abdominal: Soft. Bowel sounds are normal. She exhibits no distension. There is no tenderness. There is no rebound and no guarding. Musculoskeletal: She exhibits no edema or tenderness (BUE). Nodule is present and mildly TTP along her RLE   Lymphadenopathy:     She has no cervical adenopathy. Neurological: She is alert and oriented to person, place, and time. She exhibits normal muscle tone. Gait normal.   Skin: Skin is warm and dry. No erythema. Psychiatric: Affect normal.   Nursing note and vitals reviewed. LABS   Data Review:   Lab Results   Component Value Date/Time    WBC 5.8 10/13/2015 09:30 AM    HGB 13.7 10/13/2015 09:30 AM    HCT 42.1 10/13/2015 09:30 AM    PLATELET 065 90/93/8718 09:30 AM    MCV 89.0 10/13/2015 09:30 AM       Lab Results   Component Value Date/Time    Sodium 139 10/13/2015 09:30 AM    Potassium 4.3 10/13/2015 09:30 AM    Chloride 101 10/13/2015 09:30 AM    CO2 31 10/13/2015 09:30 AM    Anion gap 7 10/13/2015 09:30 AM    Glucose 95 10/13/2015 09:30 AM    BUN 12 10/13/2015 09:30 AM    Creatinine 0.60 10/13/2015 09:30 AM    BUN/Creatinine ratio 20 10/13/2015 09:30 AM    GFR est AA >60 10/13/2015 09:30 AM    GFR est non-AA >60 10/13/2015 09:30 AM    Calcium 9.0 10/13/2015 09:30 AM       Assessment/Plan:   1.  UC: Stable. -Continue with Rx as prescribed. -Continue to follow-up with GI for surveillance/treatment recommendations. 2.  Health Maintenance:  -Requesting her last mammogram and PAP. - I encouraged her to go to orthopedics for evaluation of her leg pain. 3.  Hypothyroidism and Thyroid Nodule Hx:  -Continue with Synthroid.  -Continue to follow-up with [facility].  - Requesting her last Endocrine note, labs, and ultrasound findings. 4. ?HTN: Resolved. - Removing diagnosis from her EHR.     Health Maintenance Due   Topic Date Due    BREAST CANCER SCRN MAMMOGRAM  05/30/2016    PAP AKA CERVICAL CYTOLOGY  03/24/2017    Shingrix Vaccine Age 50> (2 of 2) 10/30/2018    COLONOSCOPY  12/17/2018     Lab review: labs are reviewed in the EHR    I have discussed the diagnosis with the patient and the intended plan as seen in the above orders. The patient has received an after-visit summary and questions were answered concerning future plans. I have discussed medication side effects and warnings with the patient as well. I have reviewed the plan of care with the patient, accepted their input and they are in agreement with the treatment goals. All questions were answered. The patient understands the plan of care. Handouts provided today with above information. Pt instructed if symptoms worsen to call the office or report to the ED for continued care. Greater than 50% of the visit time was spent in counseling and/or coordination of care. Voice recognition was used to generate this report, which may have resulted in some phonetic based errors in grammar and contents. Even though attempts were made to correct all the mistakes, some may have been missed, and remained in the body of the document. Follow-up and Dispositions    · Return in about 1 year (around 4/23/2020).          Lulu Vidal MD

## 2019-04-23 NOTE — PROGRESS NOTES
Chief Complaint   Patient presents with    Hypertension     5 hugh follow up    ROOM 3    Labs     [facility] done 3-15-19       1. Have you been to the ER, urgent care clinic since your last visit? Hospitalized since your last visit? No    2. Have you seen or consulted any other health care providers outside of the [address] [place] [address] since your last visit? Include any pap smears or colon screening. No    Patient stated she has a Medical Directive Form, and understands to bring it in to be scanned into the chart. .  Health Maintenance Due   Topic Date Due    BREAST CANCER SCRN MAMMOGRAM  05/30/2016    PAP AKA CERVICAL CYTOLOGY  03/24/2017    Shingrix Vaccine Age 50> (2 of 2) 10/30/2018    COLONOSCOPY  12/17/2018

## 2019-04-23 NOTE — PATIENT INSTRUCTIONS
Patient stated she has a Medical Directive Form, and understands to bring it in to be scanned into the chart. .  Health Maintenance Due   Topic Date Due    BREAST CANCER SCRN MAMMOGRAM  05/30/2016    PAP AKA CERVICAL CYTOLOGY  03/24/2017    Shingrix Vaccine Age 50> (2 of 2) 10/30/2018    COLONOSCOPY  12/17/2018          Body Mass Index: Care Instructions  Your Care Instructions    Body mass index (BMI) can help you see if your weight is raising your risk for health problems. It uses a formula to compare how much you weigh with how tall you are. · A BMI lower than 18.5 is considered underweight. · A BMI between 18.5 and 24.9 is considered healthy. · A BMI between 25 and 29.9 is considered overweight. A BMI of 30 or higher is considered obese. If your BMI is in the normal range, it means that you have a lower risk for weight-related health problems. If your BMI is in the overweight or obese range, you may be at increased risk for weight-related health problems, such as high blood pressure, heart disease, stroke, arthritis or joint pain, and diabetes. If your BMI is in the underweight range, you may be at increased risk for health problems such as fatigue, lower protection (immunity) against illness, muscle loss, bone loss, hair loss, and hormone problems. BMI is just one measure of your risk for weight-related health problems. You may be at higher risk for health problems if you are not active, you eat an unhealthy diet, or you drink too much alcohol or use tobacco products. Follow-up care is a key part of your treatment and safety. Be sure to make and go to all appointments, and call your doctor if you are having problems. It's also a good idea to know your test results and keep a list of the medicines you take. How can you care for yourself at home? · Practice healthy eating habits. This includes eating plenty of fruits, vegetables, whole grains, lean protein, and low-fat dairy.   · If your doctor recommends it, get more exercise. Walking is a good choice. Bit by bit, increase the amount you walk every day. Try for at least 30 minutes on most days of the week. · Do not smoke. Smoking can increase your risk for health problems. If you need help quitting, talk to your doctor about stop-smoking programs and medicines. These can increase your chances of quitting for good. · Limit alcohol to 2 drinks a day for men and 1 drink a day for women. Too much alcohol can cause health problems. If you have a BMI higher than 25  · Your doctor may do other tests to check your risk for weight-related health problems. This may include measuring the distance around your waist. A waist measurement of more than 40 inches in men or 35 inches in women can increase the risk of weight-related health problems. · Talk with your doctor about steps you can take to stay healthy or improve your health. You may need to make lifestyle changes to lose weight and stay healthy, such as changing your diet and getting regular exercise. If you have a BMI lower than 18.5  · Your doctor may do other tests to check your risk for health problems. · Talk with your doctor about steps you can take to stay healthy or improve your health. You may need to make lifestyle changes to gain or maintain weight and stay healthy, such as getting more healthy foods in your diet and doing exercises to build muscle. Where can you learn more? Go to http://lorrie-aki.info/. Enter S176 in the search box to learn more about \"Body Mass Index: Care Instructions. \"  Current as of: June 25, 2018  Content Version: 11.9  © 3048-6470 [employer]. Care instructions adapted under license by [employer] (which disclaims liability or warranty for this information).  If you have questions about a medical condition or this instruction, always ask your healthcare professional. Sal Iglesias disclaims any warranty or liability for your use of this information.

## 2019-05-21 ENCOUNTER — TELEPHONE (OUTPATIENT)
Dept: INTERNAL MEDICINE CLINIC | Age: 59
End: 2019-05-21

## 2019-05-21 NOTE — TELEPHONE ENCOUNTER
Please let her know that I reviewed her MRI of the right tibia/fibula. 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 This area should be biopsied. Please ensure that she has a follow-up appointment with Orthopedics for a biopsy of this affected area, to rule out cancer. Although malignant peripheral nerve sheath tumors are uncommon, the patient still needs to have this area biopsied.     Dr. [facility]  [facility] [address]  Phone: [phone]  Fax: [phone]

## 2019-05-24 ENCOUNTER — TELEPHONE (OUTPATIENT)
Dept: INTERNAL MEDICINE CLINIC | Age: 59
End: 2019-05-24

## 2019-05-24 NOTE — TELEPHONE ENCOUNTER
Chief Complaint   Patient presents with    Results     per Dr Kristi Redmond MRI of Right Tibia/Fibula showing Lesion within the Posterior Medial Soft Tissues of the Right Lower Leg consistent with a Peripheral Nerve Sheath Tumor with Orthopedic [facility]     [id number] Patient reached and 2 identifiers were used: Full Name, and Date of Birth verified. The patient states she was seen by a NEURO/SURGEON AT [facility] DR RERE MERAZ ON 5-21-19. The findings are that it is a \"Benign Tumor\". The patient at this point has to decide if she wants to have surgery to remove the Tumor or not have the surgery. Dr Darwin Mac stated to the patient that there were risk involved with this type of surgery, and the patient at this point is not ready for any surgery. Dr Lisa Garnica did offer the patient Gabapentin, however the patient was not comfortable with the Side Effects of this medication, and at this time will not be taking it. The patient will be watching the Tumor, and understands to notify Dr Darwin Mac of any changes, and she will be following up with the physician in a year with a MRI test to check the status at that time on the Tumor. All understood.

## 2019-05-24 NOTE — TELEPHONE ENCOUNTER
Pt returned your call. She will be unavailable between 1-1:30 AND 2-2:30 (IN MEETINGS) but should be available otherwise.

## 2019-05-24 NOTE — TELEPHONE ENCOUNTER
Chief Complaint   Patient presents with    Results     per Dr Kamille Lechuga MRI of Right Tibia/Fibula showing Lesion within the Posterior Medial Soft Tissues of the Right Lower Leg consistent with a Peripheral Nerve Sheath Tumor with [facility]     5-24-19 left voice message for the patient to return my call.

## 2022-03-19 PROBLEM — E04.1 THYROID NODULE: Status: ACTIVE | Noted: 2017-06-11

## 2022-08-24 ENCOUNTER — OFFICE VISIT (OUTPATIENT)
Dept: INTERNAL MEDICINE CLINIC | Age: 62
End: 2022-08-24
Payer: COMMERCIAL

## 2022-08-24 VITALS
OXYGEN SATURATION: 99 % | DIASTOLIC BLOOD PRESSURE: 82 MMHG | TEMPERATURE: 97.7 F | WEIGHT: 119 LBS | RESPIRATION RATE: 14 BRPM | HEIGHT: 64 IN | SYSTOLIC BLOOD PRESSURE: 135 MMHG | BODY MASS INDEX: 20.32 KG/M2 | HEART RATE: 77 BPM

## 2022-08-24 DIAGNOSIS — E06.3 HASHIMOTO'S DISEASE: ICD-10-CM

## 2022-08-24 DIAGNOSIS — Z13.220 SCREENING FOR HYPERLIPIDEMIA: ICD-10-CM

## 2022-08-24 DIAGNOSIS — Z00.00 ROUTINE GENERAL MEDICAL EXAMINATION AT A HEALTH CARE FACILITY: Primary | ICD-10-CM

## 2022-08-24 DIAGNOSIS — E04.1 THYROID NODULE: ICD-10-CM

## 2022-08-24 DIAGNOSIS — K51.80 OTHER ULCERATIVE COLITIS WITHOUT COMPLICATION (HCC): ICD-10-CM

## 2022-08-24 PROCEDURE — 99386 PREV VISIT NEW AGE 40-64: CPT | Performed by: INTERNAL MEDICINE

## 2022-08-24 RX ORDER — ESTRADIOL 4 UG/1
INSERT VAGINAL
COMMUNITY
Start: 2022-06-07

## 2022-08-24 RX ORDER — MICONAZOLE NITRATE 2 %
CREAM WITH APPLICATOR VAGINAL 2 TIMES DAILY
COMMUNITY

## 2022-08-24 NOTE — PROGRESS NOTES
[facility] presents today for   Chief Complaint   Patient presents with    Complete Physical         1. \"Have you been to the ER, urgent care clinic since your last visit? Hospitalized since your last visit? \" no    2. \"Have you seen or consulted any other health care providers outside of the [address] [place] [address] since your last visit? \" yes     3. For patients aged 39-70: Has the patient had a colonoscopy / FIT/ Cologuard? No      If the patient is female:    4. For patients aged 41-77: Has the patient had a mammogram within the past 2 years? No  See top three    5. For patients aged 21-65: Has the patient had a pap smear?  Yes - no Care Gap present

## 2022-08-24 NOTE — PROGRESS NOTES
INTERNISTS OF [facility]:  8/24/2022, MRN: [id number]      Marleny Ramirez is a 57 y.o. female and presents to clinic for a Complete Physical      Subjective: The patient is a 68-year-old female with history of white coat HTN, hyperlipidemia, RLE schwannoma s/p removal in 2020 ([facility]), Hashimoto's disease per EHR, thyroid nodule, and ulcerative colitis (diagnosed at age 23 and followed by Laureen Mcconnell). 1. UC: On mesalamine. She gets colonoscopies every 2 years. Today she reports: her last colonoscopy was in December and \"good. \"     2. Hypothyroidism/Thyroid nodules: She has a history of Hashimoto's disease. She is followed by [facility]. Endocrinology. She is scheduled with her doctor every 6 months. 3. General:   - She had a neural sheath tumor removed in 2020 at [facility]. - No bleeding.  - No tobacco/drug use. - She has no changes to her breasts. S/p recent mammogram this month. Results were reviewed in the EHR DR MAURI MONTIEL [facility])  - She sees Kelby Bojorquez her GYN team. \"Everything was ok\"   - A DEXA was done in June per review of the [facility]. +Osteopenia. Calcium supplementation was recommended. She is taking calcium rx. - She exercises regularly and is training for a triathlon        Patient Active Problem List    Diagnosis Date Noted    Thyroid nodule 06/11/2017    Essential hypertension with goal blood pressure less than 130/80 06/22/2016    Hyperlipidemia 06/22/2016    Hashimoto's disease 03/24/2014    [facility] 03/24/2014       Current Outpatient Medications   Medication Sig Dispense Refill    Imvexxy Starter Pack 4 mcg InPk INSERT 1 CAPSULE VAGINALLY DAILY FOR 14 DAYS,THEN INSERT 1 CAPSULE TWICE WEEKLY UNTIL FINISHED      calcium citrate-vitamin D3 (Citracal + D) tablet Take  by mouth two (2) times a day. Taking 1200 mg daily. mesalamine DR (ASACOL HD) 800 mg DR tablet Take 2,400 mg by mouth two (2) times a day.       levothyroxine (SYNTHROID) 75 mcg tablet Take 75 mcg by mouth Daily (before breakfast). Vit B Comp & C-Vit E-FA-Carol-Zn 0.4 mg tab Take  by mouth daily. calcium-vitamin D3-vitamin K 650 mg-12.5 mcg-40 mcg chew Take 13 mg by mouth daily. (Patient not taking: Reported on 8/24/2022)      mesalamine (ROWASA) 4 gram/60 mL enema Insert 4 g into rectum as needed (for colitis flare-up). (Patient not taking: Reported on 8/24/2022)         Allergies   Allergen Reactions    Sulfa (Sulfonamide Antibiotics) Hives    Sulfa (Sulfonamide Antibiotics) Hives       Past Medical History:   Diagnosis Date    Autoimmune disease ([address]     Calculus of kidney     with pregnancy. H/O colonoscopy     due for repeat colonoscopy by end of 2015 per Dr. Armen Levy    Hypercholesterolemia     Hypertension     Hypothyroid     PUD (peptic ulcer disease)     Ulcerative Colitis    Thyroid disease     Hashimotos    Thyroid nodule     biopsied 1 yr ago, benign per pt with Dr Marcial Servin in [facility]. Ulcerative colitis ([facility]     last flare in Oct 2013. Dr Armen Levy. Enemas work for her. Colonoscopy scheduled for April 2014 Q2 yrs. Past Surgical History:   Procedure Laterality Date    HX BREAST AUGMENTATION  2009    HX COLONOSCOPY      Q 2 yrs. Due April 2014.     HX WISDOM TEETH EXTRACTION      x2    NEUROLOGICAL PROCEDURE UNLISTED  12/2020    peripheral nerve sheath tumor (benign)    CT BREAST SURGERY PROCEDURE UNLISTED  1992, 2009    cyst removal, augmentation    CT BREAST SURGERY PROCEDURE UNLISTED      augmentation       Family History   Problem Relation Age of Onset    Hypertension Mother     Thyroid Disease Mother     Cancer Father         lymphoma    Thyroid Disease Brother     Depression Brother     Thyroid Disease Sister     No Known Problems Maternal Grandmother     No Known Problems Maternal Grandfather     No Known Problems Paternal Grandmother     No Known Problems Paternal Grandfather     No Known Problems Son     Thyroid Disease Daughter         1 daughter       Social History     Tobacco Use Smoking status: Former     Packs/day: 0.50     Years: 3.00     Pack years: 1.50     Types: Cigarettes    Smokeless tobacco: Never   Substance Use Topics    Alcohol use: Yes     Alcohol/week: 4.0 standard drinks     Types: 4 Glasses of wine per week     Comment: weekends       ROS   Review of Systems   Constitutional:  Negative for chills and fever. HENT:  Negative for ear pain and sore throat. Eyes:  Negative for blurred vision and pain. Respiratory:  Negative for cough and shortness of breath. Cardiovascular:  Negative for chest pain. Gastrointestinal:  Negative for abdominal pain, blood in stool and melena. Genitourinary:  Negative for dysuria and hematuria. Musculoskeletal:  Negative for joint pain and myalgias. Skin:  Negative for rash. Neurological:  Negative for headaches. Endo/Heme/Allergies:  Does not bruise/bleed easily. Psychiatric/Behavioral:  Negative for substance abuse. Objective     Vitals:    08/24/22 0919   Resp: 14   Temp: 97.7 °F (36.5 °C)   TempSrc: Temporal   Weight: 119 lb (54 kg)   Height: 5' 4\" (1.626 m)   PainSc:   0 - No pain       Physical Exam  Vitals and nursing note reviewed. HENT:      Head: Normocephalic and atraumatic. Right Ear: Tympanic membrane and external ear normal.      Left Ear: Tympanic membrane and external ear normal.   Eyes:      General: No scleral icterus. Right eye: No discharge. Left eye: No discharge. Conjunctiva/sclera: Conjunctivae normal.   Cardiovascular:      Rate and Rhythm: Normal rate and regular rhythm. Heart sounds: Normal heart sounds. No murmur heard. No friction rub. No gallop. Pulmonary:      Effort: Pulmonary effort is normal. No respiratory distress. Breath sounds: Normal breath sounds. No wheezing or rales. Abdominal:      General: Bowel sounds are normal. There is no distension. Palpations: Abdomen is soft. There is no mass. Tenderness:  There is no abdominal tenderness. There is no guarding or rebound. Musculoskeletal:         General: No swelling (BUE/BLE) or tenderness (BUE/BLE). Cervical back: Neck supple. Comments: Nontender to palpation. She has full range of motion along the cervical spine, bilateral upper extremities, and bilateral lower extremities. No effusions are present. She has crepitus with flexion and extension of bilateral knee joints. Lymphadenopathy:      Cervical: No cervical adenopathy. Skin:     General: Skin is warm and dry. Coloration: Skin is not jaundiced. Neurological:      Mental Status: She is alert. Motor: No abnormal muscle tone. Gait: Gait normal.   Psychiatric:         Mood and Affect: Mood normal.       LABS   Data Review:   Lab Results   Component Value Date/Time    WBC 5.8 10/13/2015 09:30 AM    HGB 13.7 10/13/2015 09:30 AM    HCT 42.1 10/13/2015 09:30 AM    PLATELET 376 41/39/4157 09:30 AM    MCV 89.0 10/13/2015 09:30 AM       Lab Results   Component Value Date/Time    Sodium 139 10/13/2015 09:30 AM    Potassium 4.3 10/13/2015 09:30 AM    Chloride 101 10/13/2015 09:30 AM    CO2 31 10/13/2015 09:30 AM    Anion gap 7 10/13/2015 09:30 AM    Glucose 95 10/13/2015 09:30 AM    BUN 12 10/13/2015 09:30 AM    Creatinine 0.60 10/13/2015 09:30 AM    BUN/Creatinine ratio 20 10/13/2015 09:30 AM    GFR est AA >60 10/13/2015 09:30 AM    GFR est non-AA >60 10/13/2015 09:30 AM    Calcium 9.0 10/13/2015 09:30 AM       Assessment/Plan:   1. Health Maintenance:  -She has had 3 COVID vaccines. - I encouraged her to get her flu shot.  -Checking a lipid panel to screen for hyperlipidemia.  - RTC in 1 year    2. AC: Stable. - Continue with medication as prescribed. - I encouraged her to follow-up with GI for long term rx recommendations. 3.  Hypothyroidism: Stable. - Continue with medication as prescribed. - Requesting records.     Health Maintenance Due   Topic Date Due    COVID-19 Vaccine (1) Never done Colorectal Cancer Screening Combo  10/11/2019    Lipid Screen  02/11/2022     Lab review: labs are reviewed in the EHR and ordered as mentioned above. I have discussed the diagnosis with the patient and the intended plan as seen in the above orders. The patient has received an after-visit summary and questions were answered concerning future plans. I have discussed medication side effects and warnings with the patient as well. I have reviewed the plan of care with the patient, accepted their input and they are in agreement with the treatment goals. All questions were answered. The patient understands the plan of care. Handouts provided today with above information. Pt instructed if symptoms worsen to call the office or report to the ED for continued care. Greater than 50% of the visit time was spent in counseling and/or coordination of care. Voice recognition was used to generate this report, which may have resulted in some phonetic based errors in grammar and contents. Even though attempts were made to correct all the mistakes, some may have been missed, and remained in the body of the document.           Tatyana Brown MD

## 2022-08-30 ENCOUNTER — TELEPHONE (OUTPATIENT)
Dept: INTERNAL MEDICINE CLINIC | Age: 62
End: 2022-08-30

## 2022-08-30 LAB
CHOL/HDL RATIO,CHHDX: 2.4 (CALC)
CHOLEST SERPL-MCNC: 243 MG/DL
HDLC SERPL-MCNC: 100 MG/DL
LDL-CHOLESTEROL: 125 MG/DL (CALC)
NON-HDL CHOLESTEROL, 011976: 143 MG/DL (CALC)
TRIGL SERPL-MCNC: 85 MG/DL (ref ?–150)

## 2022-08-30 NOTE — TELEPHONE ENCOUNTER
----- Message from Tatyana Brown MD sent at 8/30/2022 10:07 AM EDT -----  Please let her know that her total cholesterol is 243. Her HDL is 100 and likely driving her total cholesterol to be high. Her high HDL is really good! Her triglycerides are 85. Her LDL is 115. Based on these findings, her 10-year CVD risk per the ACC/AHA risk assessment is only 4.7%. Medication is not warranted at this time to reduce her cholesterol. She should continue maintaining a heart healthy diet and exercising regularly.     Dr. Gayle Edward  Internists of [facility], [address] [facility], [facility] [address]  Phone: [phone]  Fax: [phone]

## 2022-08-30 NOTE — PROGRESS NOTES
Please let her know that her total cholesterol is 243. Her HDL is 100 and likely driving her total cholesterol to be high. Her high HDL is really good! Her triglycerides are 85. Her LDL is 115. Based on these findings, her 10-year CVD risk per the ACC/AHA risk assessment is only 4.7%. Medication is not warranted at this time to reduce her cholesterol. She should continue maintaining a heart healthy diet and exercising regularly.     Dr. Mae Ninfa  Internists of [address], [address] [place] [address] Str.  Phone: [phone]  Fax: [phone]

## 2023-08-25 ENCOUNTER — TELEPHONE (OUTPATIENT)
Age: 63
End: 2023-08-25

## 2023-08-25 NOTE — TELEPHONE ENCOUNTER
Patient notified that [facility] will no longer participate with [facility] HMO insurance as of 8/1/23. Discussed with patient the options that are available to them. Patient has PPO plan they still can be seen by [facility], but it maybe at a higher cost for out of network benefits. Patients with an HMO plan can either, call [facility] for a new primary care provider or they can call Medicare at [phone] and use the following verbiage: This was a plan misrepresentation, the plan that they signed up for gave them access to their providers and that is the reason they signed up and now they are not accessible. Medicare has a special open enrollment for [employer] patients.

## 2023-11-08 ENCOUNTER — OFFICE VISIT (OUTPATIENT)
Age: 63
End: 2023-11-08
Payer: MEDICARE

## 2023-11-08 VITALS
WEIGHT: 117.2 LBS | SYSTOLIC BLOOD PRESSURE: 154 MMHG | HEART RATE: 82 BPM | RESPIRATION RATE: 16 BRPM | HEIGHT: 64 IN | TEMPERATURE: 96.9 F | DIASTOLIC BLOOD PRESSURE: 87 MMHG | OXYGEN SATURATION: 99 % | BODY MASS INDEX: 20.01 KG/M2

## 2023-11-08 DIAGNOSIS — R03.0 TEMPORARY HIGH BLOOD PRESSURE: ICD-10-CM

## 2023-11-08 DIAGNOSIS — Z13.220 SCREENING FOR HYPERLIPIDEMIA: ICD-10-CM

## 2023-11-08 DIAGNOSIS — K51.80 OTHER ULCERATIVE COLITIS WITHOUT COMPLICATIONS (HCC): Primary | ICD-10-CM

## 2023-11-08 DIAGNOSIS — E04.1 NONTOXIC SINGLE THYROID NODULE: ICD-10-CM

## 2023-11-08 PROCEDURE — 1123F ACP DISCUSS/DSCN MKR DOCD: CPT | Performed by: INTERNAL MEDICINE

## 2023-11-08 PROCEDURE — 99211 OFF/OP EST MAY X REQ PHY/QHP: CPT

## 2023-11-08 PROCEDURE — 99214 OFFICE O/P EST MOD 30 MIN: CPT | Performed by: INTERNAL MEDICINE

## 2023-11-08 SDOH — ECONOMIC STABILITY: HOUSING INSECURITY
IN THE LAST 12 MONTHS, WAS THERE A TIME WHEN YOU DID NOT HAVE A STEADY PLACE TO SLEEP OR SLEPT IN A SHELTER (INCLUDING NOW)?: NO

## 2023-11-08 SDOH — ECONOMIC STABILITY: INCOME INSECURITY: HOW HARD IS IT FOR YOU TO PAY FOR THE VERY BASICS LIKE FOOD, HOUSING, MEDICAL CARE, AND HEATING?: NOT HARD AT ALL

## 2023-11-08 SDOH — ECONOMIC STABILITY: FOOD INSECURITY: WITHIN THE PAST 12 MONTHS, YOU WORRIED THAT YOUR FOOD WOULD RUN OUT BEFORE YOU GOT MONEY TO BUY MORE.: NEVER TRUE

## 2023-11-08 SDOH — ECONOMIC STABILITY: FOOD INSECURITY: WITHIN THE PAST 12 MONTHS, THE FOOD YOU BOUGHT JUST DIDN'T LAST AND YOU DIDN'T HAVE MONEY TO GET MORE.: NEVER TRUE

## 2023-11-08 NOTE — PROGRESS NOTES
INTERNISTS [facility]:  11/15/2023, MRN: [id number]      Antonia Lizarraga is a 70 y.o. female and presents to clinic for Annual Exam      Subjective: The patient is a 52-year-old female with history of white coat HTN, hyperlipidemia, RLE schwannoma s/p removal in 2020 ([facility]), osteopenia, Hashimoto's disease per EHR, thyroid nodule, and ulcerative colitis (diagnosed at age 23 and followed by Jairo Stovall). 1. Temporary High BP: She will check her BP at home. It is likely falsely elevated today at 154/87. B/l tinntius. 2. UC: No flare ups since her last apt. She sees GI once a year. Taking mesalamine 2400 twice daily. 3. Health Maintenance:  - She has hearing loss. She will call her insurance and find out who is in her network. - No vision changes.  - No tobacco/vaping.  - ETOH: She occasionally drinks wine - on avg 4 glasses of wine per wk. - She has a carrying card - She goes to the [employer]     4. Thyroid Nodule Hx: S/p ENT twice a year. On synthroid 75mcg daily. Patient Active Problem List    Diagnosis Date Noted    Thyroid nodule 06/11/2017    Hyperlipidemia 06/22/2016    Hashimoto's disease 03/24/2014    UC (ulcerative colitis) ([facility]) 03/24/2014       Current Outpatient Medications   Medication Sig Dispense Refill    Calcium Citrate-Vitamin D3 315-6.25 MG-MCG TABS Take by mouth 2 times daily      Estradiol Starter Pack (IMVEXXY STARTER PACK) 4 MCG INST INSERT 1 CAPSULE VAGINALLY DAILY FOR 14 DAYS,THEN INSERT 1 CAPSULE TWICE WEEKLY UNTIL FINISHED      levothyroxine (SYNTHROID) 75 MCG tablet Take 1 tablet by mouth every morning (before breakfast)      mesalamine (DELZICOL) 800 MG TBEC TBEC tablet Take 3 tablets by mouth 2 times daily       No current facility-administered medications for this visit. Allergies   Allergen Reactions    Sulfa Antibiotics Hives       Past Medical History:   Diagnosis Date    Autoimmune disease ([facility])     Calculus of kidney     with pregnancy.     H/O

## 2023-11-14 NOTE — TELEPHONE ENCOUNTER
----- Message from Richy Farnsworth MD sent at 4/4/2019  3:50 PM EDT -----  Regarding: Lab results  Please let her know that her labs show normal iron labs.  Her CMP is normal. Her CBC is normal.    Respectfully,  Dr. Noe Piper  Internists of [facility], [address] [place] [address], [address] [place] [address] Str.  Phone: [phone]  Fax: [phone] MD Madelin

## 2023-11-15 ASSESSMENT — ENCOUNTER SYMPTOMS
BLOOD IN STOOL: 0
ANAL BLEEDING: 0
COUGH: 0
SHORTNESS OF BREATH: 0
ABDOMINAL PAIN: 0
EYE PAIN: 0
SORE THROAT: 0

## 2024-01-11 LAB
CHOL/HDL RATIO,CHHDX: 2.3 (CALC)
CHOLEST SERPL-MCNC: 262 MG/DL
HDLC SERPL-MCNC: 115 MG/DL
LDL-CHOLESTEROL: 131 MG/DL (CALC)
NON-HDL CHOLESTEROL, 011976: 147 MG/DL (CALC)
TRIGL SERPL-MCNC: 69 MG/DL (ref ?–150)

## 2024-01-15 ENCOUNTER — TELEPHONE (OUTPATIENT)
Age: 64
End: 2024-01-15

## 2024-01-15 NOTE — TELEPHONE ENCOUNTER
Patient called in states that she may have a sinus infection, sh is havinf cough, fatigue facial pain. Please advise when patent can be seen.

## 2024-01-18 ENCOUNTER — TELEMEDICINE (OUTPATIENT)
Age: 64
End: 2024-01-18
Payer: MEDICARE

## 2024-01-18 DIAGNOSIS — U07.1 COVID: Primary | ICD-10-CM

## 2024-01-18 PROCEDURE — 99213 OFFICE O/P EST LOW 20 MIN: CPT | Performed by: INTERNAL MEDICINE

## 2024-01-18 PROCEDURE — 1123F ACP DISCUSS/DSCN MKR DOCD: CPT | Performed by: INTERNAL MEDICINE

## 2024-01-18 NOTE — PROGRESS NOTES
Diego Madrid is a 63 y.o. female who was seen by synchronous (real-time) audio-video technology on 1/18/2024.        Assessment & Plan:   COVID: +Having mild sx.   -She can complete the drug coupon and access Paxlovid but only in the event that she signs a waiver as part of the coupon that allows her health information to be accessible to the drug company that provides Paxlovid.  As result, she declines taking Paxlovid at this time - which was discussed/recommended if her sx worsen prior to Day 6 of sx.  - Rest.  Hydration with water.  - Quarantine for at least 5 days from her positive COVID test.  I encouraged her to wear a mask around others for up to 10 days after her positive COVID test.  - Okay to use albuterol as needed.  - She will let me know if her symptoms worsen.  I encouraged her to seek urgent medical attention if her symptoms worsen.         Diagnosis Orders   1. COVID                Lab review: labs are reviewed in the EHR     I have discussed the diagnosis with the patient and the intended plan as seen in the above orders. I have discussed medication side effects and warnings with the patient as well. I have reviewed the plan of care with the patient, accepted their input and they are in agreement with the treatment goals. All questions were answered. The patient understands the plan of care. Pt instructed if symptoms worsen to call the office or report to the ED for continued care.  Greater than 50% of the visit time was spent in counseling and/or coordination of care.     Voice recognition was used to generate this report, which may have resulted in some phonetic based errors in grammar and contents. Even though attempts were made to correct all the mistakes, some may have been missed, and remained in the body of the document.      Subjective:   Diego Madrid was seen for   Chief Complaint   Patient presents with    Follow-up     COVID       The patient is a 63-year-old female with history

## 2024-01-18 NOTE — PROGRESS NOTES
Diego Madrid presents today for   Chief Complaint   Patient presents with    Follow-up     COVID                 1. \"Have you been to the ER, urgent care clinic since your last visit?  Hospitalized since your last visit?\" Yes- 01/16/24[facility]    2. \"Have you seen or consulted any other health care providers outside of the [facility] since your last visit?\" no     3. For patients aged 45-75: Has the patient had a colonoscopy / FIT/ Cologuard? Yes - no Care Gap present      If the patient is female:    4. For patients aged 40-74: Has the patient had a mammogram within the past 2 years? Yes - no Care Gap present      5. For patients aged 21-65: Has the patient had a pap smear? NA - based on age or sex

## 2024-03-29 DIAGNOSIS — E78.5 HYPERLIPIDEMIA, UNSPECIFIED HYPERLIPIDEMIA TYPE: Primary | ICD-10-CM

## 2024-11-01 ENCOUNTER — HOSPITAL ENCOUNTER (OUTPATIENT)
Facility: HOSPITAL | Age: 64
Setting detail: SPECIMEN
Discharge: HOME | End: 2024-11-04

## 2024-11-01 LAB — LABCORP SPECIMEN COLLECTION: NORMAL

## 2024-11-01 PROCEDURE — 99001 SPECIMEN HANDLING PT-LAB: CPT

## 2024-11-02 LAB
ALBUMIN SERPL-MCNC: 4.5 G/DL (ref 3.9–4.9)
ALP SERPL-CCNC: 71 IU/L (ref 44–121)
ALT SERPL-CCNC: 15 IU/L (ref 0–32)
AST SERPL-CCNC: 23 IU/L (ref 0–40)
BILIRUB SERPL-MCNC: 0.8 MG/DL (ref 0–1.2)
BUN SERPL-MCNC: 12 MG/DL (ref 8–27)
BUN/CREAT SERPL: 16 (ref 12–28)
CALCIUM SERPL-MCNC: 9.3 MG/DL (ref 8.7–10.3)
CHLORIDE SERPL-SCNC: 100 MMOL/L (ref 96–106)
CHOLEST SERPL-MCNC: 260 MG/DL (ref 100–199)
CO2 SERPL-SCNC: 25 MMOL/L (ref 20–29)
CREAT SERPL-MCNC: 0.73 MG/DL (ref 0.57–1)
EGFRCR SERPLBLD CKD-EPI 2021: 91 ML/MIN/1.73
GLOBULIN SER CALC-MCNC: 2.1 G/DL (ref 1.5–4.5)
GLUCOSE SERPL-MCNC: 84 MG/DL (ref 70–99)
HDLC SERPL-MCNC: 111 MG/DL
LDLC SERPL CALC-MCNC: 140 MG/DL (ref 0–99)
POTASSIUM SERPL-SCNC: 4.2 MMOL/L (ref 3.5–5.2)
PROT SERPL-MCNC: 6.6 G/DL (ref 6–8.5)
SODIUM SERPL-SCNC: 140 MMOL/L (ref 134–144)
SPECIMEN STATUS REPORT: NORMAL
TRIGL SERPL-MCNC: 58 MG/DL (ref 0–149)
VLDLC SERPL CALC-MCNC: 9 MG/DL (ref 5–40)

## 2024-11-05 SDOH — HEALTH STABILITY: PHYSICAL HEALTH: ON AVERAGE, HOW MANY MINUTES DO YOU ENGAGE IN EXERCISE AT THIS LEVEL?: 90 MIN

## 2024-11-05 SDOH — HEALTH STABILITY: PHYSICAL HEALTH: ON AVERAGE, HOW MANY DAYS PER WEEK DO YOU ENGAGE IN MODERATE TO STRENUOUS EXERCISE (LIKE A BRISK WALK)?: 7 DAYS

## 2024-11-05 ASSESSMENT — PATIENT HEALTH QUESTIONNAIRE - PHQ9
SUM OF ALL RESPONSES TO PHQ QUESTIONS 1-9: 0
SUM OF ALL RESPONSES TO PHQ9 QUESTIONS 1 & 2: 0
2. FEELING DOWN, DEPRESSED OR HOPELESS: NOT AT ALL
SUM OF ALL RESPONSES TO PHQ QUESTIONS 1-9: 0
1. LITTLE INTEREST OR PLEASURE IN DOING THINGS: NOT AT ALL

## 2024-11-05 ASSESSMENT — LIFESTYLE VARIABLES
HOW OFTEN DO YOU HAVE SIX OR MORE DRINKS ON ONE OCCASION: 1
HOW MANY STANDARD DRINKS CONTAINING ALCOHOL DO YOU HAVE ON A TYPICAL DAY: 1
HOW MANY STANDARD DRINKS CONTAINING ALCOHOL DO YOU HAVE ON A TYPICAL DAY: 1 OR 2
HOW OFTEN DO YOU HAVE A DRINK CONTAINING ALCOHOL: 2-3 TIMES A WEEK
HOW OFTEN DO YOU HAVE A DRINK CONTAINING ALCOHOL: 4

## 2024-11-08 ENCOUNTER — OFFICE VISIT (OUTPATIENT)
Facility: CLINIC | Age: 64
End: 2024-11-08

## 2024-11-08 VITALS
HEIGHT: 64 IN | SYSTOLIC BLOOD PRESSURE: 136 MMHG | WEIGHT: 118 LBS | TEMPERATURE: 98.3 F | BODY MASS INDEX: 20.14 KG/M2 | HEART RATE: 86 BPM | DIASTOLIC BLOOD PRESSURE: 85 MMHG | RESPIRATION RATE: 18 BRPM | OXYGEN SATURATION: 100 %

## 2024-11-08 DIAGNOSIS — E03.9 HYPOTHYROIDISM, UNSPECIFIED TYPE: ICD-10-CM

## 2024-11-08 DIAGNOSIS — Z71.89 ADVANCE CARE PLANNING: ICD-10-CM

## 2024-11-08 DIAGNOSIS — K51.80 OTHER ULCERATIVE COLITIS WITHOUT COMPLICATIONS (HCC): ICD-10-CM

## 2024-11-08 DIAGNOSIS — Z78.0 POSTMENOPAUSAL: ICD-10-CM

## 2024-11-08 DIAGNOSIS — M85.80 OSTEOPENIA, UNSPECIFIED LOCATION: ICD-10-CM

## 2024-11-08 DIAGNOSIS — Z00.00 INITIAL MEDICARE ANNUAL WELLNESS VISIT: ICD-10-CM

## 2024-11-08 DIAGNOSIS — E78.5 HYPERLIPIDEMIA, UNSPECIFIED HYPERLIPIDEMIA TYPE: Primary | ICD-10-CM

## 2024-11-08 RX ORDER — ROSUVASTATIN CALCIUM 5 MG/1
5 TABLET, COATED ORAL NIGHTLY
Qty: 30 TABLET | Refills: 5 | Status: SHIPPED | OUTPATIENT
Start: 2024-11-08

## 2024-11-08 SDOH — ECONOMIC STABILITY: INCOME INSECURITY: HOW HARD IS IT FOR YOU TO PAY FOR THE VERY BASICS LIKE FOOD, HOUSING, MEDICAL CARE, AND HEATING?: NOT HARD AT ALL

## 2024-11-08 SDOH — ECONOMIC STABILITY: FOOD INSECURITY: WITHIN THE PAST 12 MONTHS, THE FOOD YOU BOUGHT JUST DIDN'T LAST AND YOU DIDN'T HAVE MONEY TO GET MORE.: NEVER TRUE

## 2024-11-08 SDOH — ECONOMIC STABILITY: FOOD INSECURITY: WITHIN THE PAST 12 MONTHS, YOU WORRIED THAT YOUR FOOD WOULD RUN OUT BEFORE YOU GOT MONEY TO BUY MORE.: NEVER TRUE

## 2024-11-08 NOTE — PROGRESS NOTES
Diego Madrid presents today for   Chief Complaint   Patient presents with    Medicare AWV    Follow-up           \"Have you been to the ER, urgent care clinic since your last visit?  Hospitalized since your last visit?\"    NO    “Have you seen or consulted any other health care providers outside of [facility] since your last visit?”    YES - When: approximately 5 months ago.  Where and Why: Endo-f/u; [id number].             
CAPSULE VAGINALLY DAILY FOR 14 DAYS,THEN INSERT 1 CAPSULE TWICE WEEKLY UNTIL FINISHED Yes Automatic Reconciliation, Ar   levothyroxine (SYNTHROID) 75 MCG tablet Take 1 tablet by mouth every morning (before breakfast) Yes Automatic Reconciliation, Ar   mesalamine (DELZICOL) 800 MG TBEC TBEC tablet Take 3 tablets by mouth 2 times daily Yes Automatic Reconciliation, Ar       CareTeam (Including outside providers/suppliers regularly involved in providing care):   Patient Care Team:  Tegan Vieyra MD as PCP - General (Family Medicine)  Tegan Vieyra MD as PCP - Empaneled Provider      Reviewed and updated this visit:  Allergies  Meds  Problems  Med Hx  Surg Hx  Soc Hx  Fam Hx

## 2024-11-14 ASSESSMENT — ENCOUNTER SYMPTOMS
BLOOD IN STOOL: 0
SHORTNESS OF BREATH: 0
ANAL BLEEDING: 0
COUGH: 0
EYE PAIN: 0
SORE THROAT: 0
ABDOMINAL PAIN: 0

## 2024-11-15 NOTE — ACP (ADVANCE CARE PLANNING)
Advance Care Planning     General Advance Care Planning (ACP) Conversation    Date of Conversation: 11/8/24    Conducted with: Patient with Decision Making Capacity    Healthcare Decision Maker:  Today we discussed Healthcare Decision Makers. The patient is considering options.    Content/Action Overview:  Has NO ACP documents-Information provided    Her  is to be her primary POA. She has children but wants her daughter to be her POA as well. She has a living will.  We don't have records. I encouraged her to bring her living will with her so that we can scan the advance directive portion into her chart.     Length of Voluntary ACP Conversation in minutes:  <16 minutes (Non-Billable)    Tgean Vieyra MD

## 2024-11-15 NOTE — PATIENT INSTRUCTIONS
and not another kind of pain reliever, such as acetaminophen (Tylenol).   When should you call for help?   Call 911 if you have symptoms of a heart attack. These may include:    Chest pain or pressure, or a strange feeling in the chest.     Sweating.     Shortness of breath.     Pain, pressure, or a strange feeling in the back, neck, jaw, or upper belly or in one or both shoulders or arms.     Lightheadedness or sudden weakness.     A fast or irregular heartbeat.   After you call 911, the  may tell you to chew 1 adult-strength or 2 to 4 low-dose aspirin. Wait for an ambulance. Do not try to drive yourself.  Watch closely for changes in your health, and be sure to contact your doctor if you have any problems.  Where can you learn more?  Go to https://www.dev9k.net/patientEd and enter F075 to learn more about \"A Healthy Heart: Care Instructions.\"  Current as of: June 24, 2023  Content Version: 14.2  © 2024 [employer].   Care instructions adapted under license by [employer]. If you have questions about a medical condition or this instruction, always ask your healthcare professional. [employer] disclaims any warranty or liability for your use of this information.      Personalized Preventive Plan for Diego Madrid - 11/8/2024  Medicare offers a range of preventive health benefits. Some of the tests and screenings are paid in full while other may be subject to a deductible, co-insurance, and/or copay.    Some of these benefits include a comprehensive review of your medical history including lifestyle, illnesses that may run in your family, and various assessments and screenings as appropriate.    After reviewing your medical record and screening and assessments performed today your provider may have ordered immunizations, labs, imaging, and/or referrals for you.  A list of these orders (if applicable) as well as your Preventive Care list are included within your After Visit

## 2024-12-02 ENCOUNTER — TELEPHONE (OUTPATIENT)
Facility: CLINIC | Age: 64
End: 2024-12-02

## 2024-12-02 NOTE — TELEPHONE ENCOUNTER
Called pt to advise to r/o to CS to f/u on her prior auth for Bone density scan. Pt stated she scheduled her Bone density w/[facility]. Advised pt to r/o to [facility] where scheduled to advise prior auth is needed for Bone density.    Pt verbalized understanding.      MICH Vitale LPN

## 2024-12-02 NOTE — TELEPHONE ENCOUNTER
Pt called in states she has an appt for a bone density scan at [facility] on 12/4/24 but insurance told Pt she needs a PA for the scan.     Please advise.    Call back req if Pt need to r/s appt.

## 2025-01-09 ENCOUNTER — OFFICE VISIT (OUTPATIENT)
Facility: CLINIC | Age: 65
End: 2025-01-09

## 2025-01-09 VITALS
HEIGHT: 64 IN | DIASTOLIC BLOOD PRESSURE: 77 MMHG | SYSTOLIC BLOOD PRESSURE: 139 MMHG | RESPIRATION RATE: 18 BRPM | WEIGHT: 121 LBS | BODY MASS INDEX: 20.66 KG/M2 | TEMPERATURE: 97.5 F | OXYGEN SATURATION: 98 % | HEART RATE: 66 BPM

## 2025-01-09 DIAGNOSIS — E78.5 HYPERLIPIDEMIA, UNSPECIFIED HYPERLIPIDEMIA TYPE: Primary | ICD-10-CM

## 2025-01-09 DIAGNOSIS — K51.80 OTHER ULCERATIVE COLITIS WITHOUT COMPLICATIONS (HCC): ICD-10-CM

## 2025-01-09 DIAGNOSIS — Z00.00 MEDICARE ANNUAL WELLNESS VISIT, SUBSEQUENT: ICD-10-CM

## 2025-01-09 DIAGNOSIS — Z71.89 ADVANCE CARE PLANNING: ICD-10-CM

## 2025-01-09 DIAGNOSIS — E03.9 HYPOTHYROIDISM, UNSPECIFIED TYPE: ICD-10-CM

## 2025-01-09 SDOH — ECONOMIC STABILITY: FOOD INSECURITY: WITHIN THE PAST 12 MONTHS, THE FOOD YOU BOUGHT JUST DIDN'T LAST AND YOU DIDN'T HAVE MONEY TO GET MORE.: NEVER TRUE

## 2025-01-09 SDOH — ECONOMIC STABILITY: FOOD INSECURITY: WITHIN THE PAST 12 MONTHS, YOU WORRIED THAT YOUR FOOD WOULD RUN OUT BEFORE YOU GOT MONEY TO BUY MORE.: NEVER TRUE

## 2025-01-09 ASSESSMENT — ENCOUNTER SYMPTOMS
ABDOMINAL PAIN: 0
BLOOD IN STOOL: 0
EYE PAIN: 0
SHORTNESS OF BREATH: 0
ANAL BLEEDING: 0
COUGH: 0
SORE THROAT: 0

## 2025-01-09 ASSESSMENT — PATIENT HEALTH QUESTIONNAIRE - PHQ9
SUM OF ALL RESPONSES TO PHQ9 QUESTIONS 1 & 2: 1
2. FEELING DOWN, DEPRESSED OR HOPELESS: NOT AT ALL
SUM OF ALL RESPONSES TO PHQ QUESTIONS 1-9: 1
1. LITTLE INTEREST OR PLEASURE IN DOING THINGS: SEVERAL DAYS
SUM OF ALL RESPONSES TO PHQ QUESTIONS 1-9: 1

## 2025-01-09 ASSESSMENT — LIFESTYLE VARIABLES
HOW MANY STANDARD DRINKS CONTAINING ALCOHOL DO YOU HAVE ON A TYPICAL DAY: 1 OR 2
HOW OFTEN DO YOU HAVE A DRINK CONTAINING ALCOHOL: 2-3 TIMES A WEEK

## 2025-01-09 NOTE — PROGRESS NOTES
[facility] [place]:  1/9/2025, MRN: [id number]      Diego Madrid is a 64 y.o. female and presents to clinic for Medicare AWV and Follow-up      Subjective:   The patient is a 64-year-old female with history of white coat HTN, HLD, RLE schwannoma s/p removal in 2020 ([facility]), osteopenia, Hashimoto's disease per EHR, thyroid nodule, and ulcerative colitis (diagnosed at age 16 and previously followed by ).     1.  Hyperlipidemia: Her LDL in November was 140.  Although her CVD risk was 6.3% per the ACC/AHA risk assessment, per our discussion, she opted to try Crestor 5 mg daily to help reduce her CVD risk even further.  Follow-up labs were ordered.  The patient had labs in between apts. Her LDL improved. She has had some muscle weakness and muscle soreness. No adverse side effects from Crestor.  Reviewed her lab results show that her total cholesterol is 214.  HDL is 117.  Triglycerides 57.  LDL is 84 per her lab results collected January 2, 2025.  Her CMP is unremarkable.  I will have these results scanned to her chart from [facility].    2.  Hypothyroidism  and UC: Followed by GI.  On mesalamine 3 800 mg tablets twice daily and on Synthroid 75 mcg daily.  Asymptomatic.      Patient Active Problem List    Diagnosis Date Noted    Thyroid nodule 06/11/2017    Hyperlipidemia 06/22/2016    Hashimoto's disease 03/24/2014    UC (ulcerative colitis) ([facility]) 03/24/2014       Current Outpatient Medications   Medication Sig Dispense Refill    MULTIPLE VITAMINS PO Take by mouth daily      Calcium-Vitamin D-Vitamin K 500-500-40 MG-UNT-MCG CHEW Take 13 mg by mouth daily      COLLAGEN PO Take by mouth      rosuvastatin (CRESTOR) 5 MG tablet Take 1 tablet by mouth nightly 30 tablet 5    Calcium Citrate-Vitamin D3 315-6.25 MG-MCG TABS Take by mouth 2 times daily      Estradiol Starter Pack (IMVEXXY STARTER PACK) 4 MCG INST INSERT 1 CAPSULE VAGINALLY DAILY FOR 14 DAYS,THEN INSERT 1 CAPSULE TWICE WEEKLY UNTIL FINISHED

## 2025-01-09 NOTE — ACP (ADVANCE CARE PLANNING)
Advance Care Planning     General Advance Care Planning (ACP) Conversation    Date of Conversation: 1/9/24    Conducted with: Patient with Decision Making Capacity    Healthcare Decision Maker:  Today we documented Decision Maker(s) consistent with Legal Next of Kin hierarchy.    Content/Action Overview:  Has NO ACP documents-Information provided    She prefers her daughter to be her primary POA.  She does not have a complete advance directive on file.  I encouraged her to complete 1.  She was given 1 to complete. She has 3 kids.     Length of Voluntary ACP Conversation in minutes:  <16 minutes (Non-Billable)    Tegan Vieyra MD

## 2025-01-09 NOTE — PATIENT INSTRUCTIONS

## 2025-01-09 NOTE — PROGRESS NOTES
Diego Madrid presents today for   Chief Complaint   Patient presents with    Medicare AWV    Follow-up     BP RECHECK      \"Have you been to the ER, urgent care clinic since your last visit?  Hospitalized since your last visit?\"    NO    “Have you seen or consulted any other health care providers outside of [facility] since your last visit?”    NO

## 2025-01-20 ENCOUNTER — HOSPITAL ENCOUNTER (OUTPATIENT)
Facility: HOSPITAL | Age: 65
Discharge: HOME | End: 2025-01-23
Attending: INTERNAL MEDICINE
Payer: MEDICARE

## 2025-01-20 DIAGNOSIS — M85.80 OSTEOPENIA, UNSPECIFIED LOCATION: ICD-10-CM

## 2025-01-20 DIAGNOSIS — Z78.0 POSTMENOPAUSAL: ICD-10-CM

## 2025-01-20 PROCEDURE — 77080 DXA BONE DENSITY AXIAL: CPT

## 2025-01-22 ENCOUNTER — TELEPHONE (OUTPATIENT)
Facility: CLINIC | Age: 65
End: 2025-01-22

## 2025-01-22 DIAGNOSIS — M81.0 OSTEOPOROSIS, UNSPECIFIED OSTEOPOROSIS TYPE, UNSPECIFIED PATHOLOGICAL FRACTURE PRESENCE: Primary | ICD-10-CM

## 2025-01-22 NOTE — TELEPHONE ENCOUNTER
----- Message from Dr. Tegan Vieyra MD sent at 1/22/2025  1:16 PM EST -----  Please let her know that her DEXA scan results show osteoporosis. I am placing a referral to endocrinology for her to discuss treatment recommendations with them.     Dr. Tegan Vieyra  [facility] [address], [address]  [place] [address]  Phone: [phone]  Fax: [phone]

## 2025-01-22 NOTE — TELEPHONE ENCOUNTER
Called pt to notify of results. Pt verbalized understanding. Pt state she is seeing a endocrinologist for thyroid via [facility] (Dr. Mey Houston) and questioned why she would be seeing an endo for osteoporosis.   Advised will f/u once speaking to provider to clarify.    MICH Vitale LPN

## 2025-01-23 ENCOUNTER — TELEPHONE (OUTPATIENT)
Facility: CLINIC | Age: 65
End: 2025-01-23

## 2025-01-23 ENCOUNTER — PATIENT MESSAGE (OUTPATIENT)
Facility: CLINIC | Age: 65
End: 2025-01-23

## 2025-01-23 DIAGNOSIS — M81.0 OSTEOPOROSIS, UNSPECIFIED OSTEOPOROSIS TYPE, UNSPECIFIED PATHOLOGICAL FRACTURE PRESENCE: Primary | ICD-10-CM

## 2025-01-23 DIAGNOSIS — E03.9 HYPOTHYROIDISM, UNSPECIFIED TYPE: ICD-10-CM

## 2025-01-23 NOTE — TELEPHONE ENCOUNTER
Hi Dr. Vieyar,    Referral to Endo to Mey Duarte has been added per your request. Please release and advise once complete. Thank you.      MICH Vitale LPN

## 2025-01-23 NOTE — TELEPHONE ENCOUNTER
Referral order signed.    Dr. Tegan Vieyra  [facility] [address], [address]  [place] [address]  Phone: [phone]  Fax: [phone]

## 2025-01-23 NOTE — TELEPHONE ENCOUNTER
----- Message from Dr. Tegan Vieyra MD sent at 1/22/2025  2:36 PM EST -----  Regarding: RE: Results-Referral Question  Rickey Forte,    That's great! Please place the order to the same provider () for me to sign. I can add the dx code.      Thanks,  Dr. Tegan Vieyra  [facility] [address], [place] [address]  Phone: [phone]  Fax: [phone]  ----- Message -----  From: Jann Vitale LPN  Sent: 1/22/2025   1:37 PM EST  To: Tegan Vieyra MD  Subject: Results-Referral Question                        Rickey Vieyra,    Spoke to pt in reference to results. Per results, pt has osteoporosis and was referred to an endocrinologist. Just wanted to clarify if this was correct and where you wanted the patient to go.    Per pt, she is already seeing an endocrinologist for thyroid. She is seeing Dr. Mey Duarte.    Please advise.    MICH Vitale LPN  ----- Message -----  From: Tegan Vieyra MD  Sent: 1/22/2025   1:16 PM EST  To: [facility] Clinical Staff    Please let her know that her DEXA scan results show osteoporosis. I am placing a referral to endocrinology for her to discuss treatment recommendations with them.     Dr. Tegan Vieyra  [facility] [address], [place] [address]  Phone: [phone]  Fax: [phone]

## 2025-02-10 ENCOUNTER — TELEPHONE (OUTPATIENT)
Facility: CLINIC | Age: 65
End: 2025-02-10

## 2025-02-10 NOTE — TELEPHONE ENCOUNTER
Spoke with ronal and is aware pt has been notified and is scheduling with her endocrinologist for treatment of her underlying osteoporosis.

## 2025-02-10 NOTE — TELEPHONE ENCOUNTER
Please let her know that the patient has already been notified and is scheduling with her endocrinologist for treatment of her underlying osteoporosis.  Please make sure that her bone density study was sent to endocrinology if not already done.    Dr. Tegan Vieyra  [facility] [address], [address]  [place] [address]  Phone: [phone]  Fax: [phone]

## 2025-02-10 NOTE — TELEPHONE ENCOUNTER
Jacinta from Dr Margarita Mock's office called   Stating they received results of patients bone density test from Dr Jarrell WARD asking if this is a copy for her? or does   Dr Vieyra want  Dr Mock to  give pt results   Please advise   Please advise   Jacinta - [phone]